# Patient Record
Sex: MALE | Race: WHITE | Employment: FULL TIME | URBAN - METROPOLITAN AREA
[De-identification: names, ages, dates, MRNs, and addresses within clinical notes are randomized per-mention and may not be internally consistent; named-entity substitution may affect disease eponyms.]

---

## 2021-04-11 DIAGNOSIS — Z23 NEED FOR VACCINATION: ICD-10-CM

## 2023-09-22 ENCOUNTER — LAB ENCOUNTER (OUTPATIENT)
Dept: LAB | Age: 48
End: 2023-09-22
Attending: STUDENT IN AN ORGANIZED HEALTH CARE EDUCATION/TRAINING PROGRAM
Payer: COMMERCIAL

## 2023-09-22 ENCOUNTER — HOSPITAL ENCOUNTER (OUTPATIENT)
Dept: GENERAL RADIOLOGY | Age: 48
Discharge: HOME | End: 2023-09-22
Attending: STUDENT IN AN ORGANIZED HEALTH CARE EDUCATION/TRAINING PROGRAM
Payer: COMMERCIAL

## 2023-09-22 DIAGNOSIS — R14.0 BLOATING: ICD-10-CM

## 2023-09-22 DIAGNOSIS — R19.7 DIARRHEA, UNSPECIFIED TYPE: ICD-10-CM

## 2023-09-22 PROBLEM — R39.11 HESITANCY OF MICTURITION: Status: ACTIVE | Noted: 2022-10-17

## 2023-09-22 PROBLEM — R39.15 URGENCY OF URINATION: Status: ACTIVE | Noted: 2022-10-17

## 2023-09-22 PROBLEM — N39.43 BENIGN PROSTATIC HYPERPLASIA (BPH) WITH POST-VOID DRIBBLING: Status: ACTIVE | Noted: 2022-10-17

## 2023-09-22 PROBLEM — N40.1 BENIGN PROSTATIC HYPERPLASIA (BPH) WITH POST-VOID DRIBBLING: Status: ACTIVE | Noted: 2022-10-17

## 2023-09-22 PROBLEM — N20.0 KIDNEY STONE: Status: ACTIVE | Noted: 2022-10-17

## 2023-09-22 PROBLEM — R31.0 GROSS HEMATURIA: Status: ACTIVE | Noted: 2022-10-17

## 2023-09-22 LAB
ALBUMIN SERPL-MCNC: 4.2 G/DL (ref 3.4–5)
ALBUMIN/GLOB SERPL: 1.1 {RATIO} (ref 1–2)
ALP LIVER SERPL-CCNC: 88 U/L
ALT SERPL-CCNC: 49 U/L
ANION GAP SERPL CALC-SCNC: 5 MMOL/L (ref 0–18)
AST SERPL-CCNC: 29 U/L (ref 15–37)
BASOPHILS # BLD AUTO: 0.03 X10(3) UL (ref 0–0.2)
BASOPHILS NFR BLD AUTO: 0.3 %
BILIRUB SERPL-MCNC: 0.5 MG/DL (ref 0.1–2)
BUN BLD-MCNC: 16 MG/DL (ref 7–18)
CALCIUM BLD-MCNC: 9.2 MG/DL (ref 8.5–10.1)
CHLORIDE SERPL-SCNC: 107 MMOL/L (ref 98–112)
CO2 SERPL-SCNC: 30 MMOL/L (ref 21–32)
CREAT BLD-MCNC: 1.07 MG/DL
EGFRCR SERPLBLD CKD-EPI 2021: 85 ML/MIN/1.73M2 (ref 60–?)
EOSINOPHIL # BLD AUTO: 0.28 X10(3) UL (ref 0–0.7)
EOSINOPHIL NFR BLD AUTO: 3.1 %
ERYTHROCYTE [DISTWIDTH] IN BLOOD BY AUTOMATED COUNT: 13.6 %
FASTING STATUS PATIENT QL REPORTED: YES
GLOBULIN PLAS-MCNC: 3.8 G/DL (ref 2.8–4.4)
GLUCOSE BLD-MCNC: 133 MG/DL (ref 70–99)
HCT VFR BLD AUTO: 44.3 %
HGB BLD-MCNC: 15.2 G/DL
IGA SERPL-MCNC: 322 MG/DL (ref 70–312)
IMM GRANULOCYTES # BLD AUTO: 0.03 X10(3) UL (ref 0–1)
IMM GRANULOCYTES NFR BLD: 0.3 %
INR BLD: 0.94 (ref 0.85–1.16)
LIPASE SERPL-CCNC: 64 U/L (ref 13–75)
LYMPHOCYTES # BLD AUTO: 3.53 X10(3) UL (ref 1–4)
LYMPHOCYTES NFR BLD AUTO: 38.9 %
MCH RBC QN AUTO: 28.3 PG (ref 26–34)
MCHC RBC AUTO-ENTMCNC: 34.3 G/DL (ref 31–37)
MCV RBC AUTO: 82.5 FL
MONOCYTES # BLD AUTO: 0.59 X10(3) UL (ref 0.1–1)
MONOCYTES NFR BLD AUTO: 6.5 %
NEUTROPHILS # BLD AUTO: 4.61 X10 (3) UL (ref 1.5–7.7)
NEUTROPHILS # BLD AUTO: 4.61 X10(3) UL (ref 1.5–7.7)
NEUTROPHILS NFR BLD AUTO: 50.9 %
OSMOLALITY SERPL CALC.SUM OF ELEC: 297 MOSM/KG (ref 275–295)
PLATELET # BLD AUTO: 202 10(3)UL (ref 150–450)
POTASSIUM SERPL-SCNC: 3.7 MMOL/L (ref 3.5–5.1)
PROT SERPL-MCNC: 8 G/DL (ref 6.4–8.2)
PROTHROMBIN TIME: 12.6 SECONDS (ref 11.6–14.8)
RBC # BLD AUTO: 5.37 X10(6)UL
SODIUM SERPL-SCNC: 142 MMOL/L (ref 136–145)
WBC # BLD AUTO: 9.1 X10(3) UL (ref 4–11)

## 2023-09-22 PROCEDURE — 74019 RADEX ABDOMEN 2 VIEWS: CPT | Performed by: STUDENT IN AN ORGANIZED HEALTH CARE EDUCATION/TRAINING PROGRAM

## 2023-09-22 PROCEDURE — 85610 PROTHROMBIN TIME: CPT

## 2023-09-22 PROCEDURE — 86364 TISS TRNSGLTMNASE EA IG CLAS: CPT

## 2023-09-22 PROCEDURE — 85025 COMPLETE CBC W/AUTO DIFF WBC: CPT

## 2023-09-22 PROCEDURE — 83690 ASSAY OF LIPASE: CPT

## 2023-09-22 PROCEDURE — 82784 ASSAY IGA/IGD/IGG/IGM EACH: CPT

## 2023-09-22 PROCEDURE — 36415 COLL VENOUS BLD VENIPUNCTURE: CPT

## 2023-09-22 PROCEDURE — 80053 COMPREHEN METABOLIC PANEL: CPT

## 2023-09-23 ENCOUNTER — LAB ENCOUNTER (OUTPATIENT)
Dept: LAB | Age: 48
End: 2023-09-23
Attending: STUDENT IN AN ORGANIZED HEALTH CARE EDUCATION/TRAINING PROGRAM
Payer: COMMERCIAL

## 2023-09-23 ENCOUNTER — HOSPITAL ENCOUNTER (OUTPATIENT)
Dept: ULTRASOUND IMAGING | Age: 48
Discharge: HOME | End: 2023-09-23
Attending: STUDENT IN AN ORGANIZED HEALTH CARE EDUCATION/TRAINING PROGRAM
Payer: COMMERCIAL

## 2023-09-23 DIAGNOSIS — K76.0 FATTY LIVER: ICD-10-CM

## 2023-09-23 DIAGNOSIS — R19.7 DIARRHEA, UNSPECIFIED TYPE: ICD-10-CM

## 2023-09-23 DIAGNOSIS — R14.0 BLOATING: ICD-10-CM

## 2023-09-23 PROCEDURE — 87177 OVA AND PARASITES SMEARS: CPT

## 2023-09-23 PROCEDURE — 87046 STOOL CULTR AEROBIC BACT EA: CPT

## 2023-09-23 PROCEDURE — 87077 CULTURE AEROBIC IDENTIFY: CPT

## 2023-09-23 PROCEDURE — 87209 SMEAR COMPLEX STAIN: CPT

## 2023-09-23 PROCEDURE — 87427 SHIGA-LIKE TOXIN AG IA: CPT

## 2023-09-23 PROCEDURE — 87493 C DIFF AMPLIFIED PROBE: CPT

## 2023-09-23 PROCEDURE — 87329 GIARDIA AG IA: CPT

## 2023-09-23 PROCEDURE — 83993 ASSAY FOR CALPROTECTIN FECAL: CPT

## 2023-09-23 PROCEDURE — 87045 FECES CULTURE AEROBIC BACT: CPT

## 2023-09-23 PROCEDURE — 76700 US EXAM ABDOM COMPLETE: CPT | Performed by: STUDENT IN AN ORGANIZED HEALTH CARE EDUCATION/TRAINING PROGRAM

## 2023-09-23 PROCEDURE — 87272 CRYPTOSPORIDIUM AG IF: CPT

## 2023-09-23 PROCEDURE — 82656 EL-1 FECAL QUAL/SEMIQ: CPT

## 2023-09-24 LAB
C DIFF TOX B STL QL: NEGATIVE
CRYPTOSP AG STL QL IA: NEGATIVE
G LAMBLIA AG STL QL IA: NEGATIVE

## 2023-09-25 LAB
CALPROTECTIN STL-MCNT: 124 ΜG/G (ref ?–50)
TTG IGA SER-ACNC: 0.8 U/ML (ref ?–7)

## 2023-09-25 NOTE — PROGRESS NOTES
C diff neg- schedule EGD, Colonoscopy at [facility] - next week preferably     ----    Kt Aldridge,    Your recent stool study shows that there may be non specific inflammation in your GI tract. I recommend further investigation with an EGD, Colonsocopy - we will call you to schedule this. Your recent stool study shows NO signs of a C difficile infection. Please let me know if you have any questions or concerns.      Sincerely,  Herminio Lino MD

## 2023-09-25 NOTE — PROGRESS NOTES
Kenyon Mota,    Your recent blood work shows NO signs of celiac disease. Your liver enzymes are normal.     Your blood sugar and level of immunoglobulin, which is a protein that is part of your immune system, is higher than normal limits. I recommend you follow-up with your primary care physician for further evaluation of these findings. Please let me know if you have any questions or concerns.      Sincerely,  Brad Corrales MD

## 2023-09-25 NOTE — PROGRESS NOTES
Hi Sofie Devlin,    Your recent ultrasound showed a fatty liver. This is where extra fat deposits within the liver. However, if fatty liver is left untreated, this can potentially lead to other liver and heart issues and conditions. I recommend that you follow a low fat and low calorie diet, with a diet high in fruits and vegetables. I also recommend that you lose about 8-10% of your body weight with diet and exercise in the next 6-12 months, to help minimize the risk of fatty liver. I also recommend that you schedule an appointment with weight loss clinic to help with this, as we discussed in GI clinic. Please let me know if you have any questions or concerns.      Sincerely,  Joaquin Campos MD

## 2023-10-03 LAB — PANCREATIC ELAST FECAL: 307 UG ELAST./G

## 2023-10-24 PROBLEM — R19.7 DIARRHEA: Status: ACTIVE | Noted: 2023-10-24

## 2023-10-24 PROBLEM — R11.0 NAUSEA: Status: ACTIVE | Noted: 2023-10-24

## 2023-10-24 PROBLEM — Z12.11 SPECIAL SCREENING FOR MALIGNANT NEOPLASM OF COLON: Status: ACTIVE | Noted: 2023-10-24

## 2023-10-24 PROBLEM — R14.1 ABDOMINAL GAS PAIN: Status: ACTIVE | Noted: 2023-10-24

## 2023-12-06 ENCOUNTER — GENETICS ENCOUNTER (OUTPATIENT)
Dept: GENETICS | Age: 48
End: 2023-12-06
Attending: GENETIC COUNSELOR, MS
Payer: COMMERCIAL

## 2023-12-06 ENCOUNTER — LABORATORY ENCOUNTER (OUTPATIENT)
Dept: LAB | Age: 48
End: 2023-12-06
Attending: INTERNAL MEDICINE
Payer: COMMERCIAL

## 2023-12-06 ENCOUNTER — NURSE ONLY (OUTPATIENT)
Dept: HEMATOLOGY/ONCOLOGY | Age: 48
End: 2023-12-06
Attending: GENETIC COUNSELOR, MS
Payer: COMMERCIAL

## 2023-12-06 DIAGNOSIS — Z01.818 PRE-OP TESTING: ICD-10-CM

## 2023-12-06 DIAGNOSIS — Z80.41 FAMILY HISTORY OF OVARIAN CANCER: ICD-10-CM

## 2023-12-06 DIAGNOSIS — Z86.010 HISTORY OF COLONOSCOPY WITH POLYPECTOMY: Primary | ICD-10-CM

## 2023-12-06 DIAGNOSIS — Z80.0 FAMILY HISTORY OF MALIGNANT NEOPLASM OF DIGESTIVE ORGAN: ICD-10-CM

## 2023-12-06 DIAGNOSIS — Z98.890 HISTORY OF COLONOSCOPY WITH POLYPECTOMY: Primary | ICD-10-CM

## 2023-12-06 LAB
CHLORIDE SERPL-SCNC: 105 MMOL/L (ref 98–112)
CO2 SERPL-SCNC: 27 MMOL/L (ref 21–32)
POTASSIUM SERPL-SCNC: 3.6 MMOL/L (ref 3.5–5.1)
SODIUM SERPL-SCNC: 139 MMOL/L (ref 136–145)

## 2023-12-06 PROCEDURE — 36415 COLL VENOUS BLD VENIPUNCTURE: CPT

## 2023-12-06 PROCEDURE — 80051 ELECTROLYTE PANEL: CPT

## 2023-12-06 PROCEDURE — 96040 HC GENETIC COUNSELING EA 30 MIN: CPT | Performed by: GENETIC COUNSELOR, MS

## 2023-12-06 NOTE — PROGRESS NOTES
Referring Provider:   Brea Ji MD    Additional Provder(s): MD Nancy Pak MD    Reason for Referral:  Leda Clements was referred for genetic counseling because of a personal history of a large adenomatous colon polyp. Mr. Annette Mathias is a 46year-old man of [facility] descent who has no personal history of cancer. His first screening colonoscopy on 10/24/23 was notable for a 2.1 cm tubular adenoma in the sigmoid colon. Mr. Sarah Landa 10/24/23 EGD was notable for four gastric polyps, ranging in size from five to 15 mm, two sub-cm, non-ulcerated, sub-epithelial lesions (CHEIKH) in the proximal antrum, and two gastric ulcers. A repeat screening colonoscopy was recommended in three years. A follow-up EGD in eight weeks with EUS with Dr Alysha Barrett for evaluation of CHEIKH in stomach was also recommended; this is scheduled for 23. Mr. Annette Mathias is taking Protonix. Social History:  Mr. Annette Mathias was seen today by himself. He lives in [place]. Family History:   A three generation pedigree was obtained. Mr. Annette Mathias has two daughters, ages 21 and 21. Mr. Annette Mathias has two sisters and no brothers. Mr. Sarah Landa mother  at age 76 from congesitive heart failure. Mr. Sarah Landa mother had two brothers and three sisters. One of Mr. Sarah Landa maternal aunts had breast and ovarian cancer. A second maternal aunt had breast cancer at an unspecified age. Neither of Mr. Sarah Landa aunts have had genetic testing. Mr. Sarah Landa maternal grandmother  in her [de-identified] and had an unspecified gynecological cancer. Mr. Sarah Landa maternal grandfather  in his [de-identified] and did not have cancer. Mr. Sarah Landa father  at age 64 from congestive heart failure; he had a history of multiple unspecified skin cancers. Mr. Sarah Landa father had three brothers and two sisters, all of whom are . One of Mr. Sarah Landa paternal uncles  before age 61 from prostate cancer.  A second paternal uncle  before age 61 from metastatic cancer involving the bile duct, stomach, and liver. Mr. Higinio Alvarez third paternal uncle had an unspecified skin cancer and also  before age 61. One of Mr. Higinio Alvarez paternal cousins  at age 25 from an unspecified acute hematologic cancer. Mr. Higinio Alvarez paternal grandmother  in her [de-identified] and did not have cancer. Mr. Higinio Alvarez paternal grandfather  in his 46s from what was reported at that time to be gastric cancer. Please see the pedigree for additional family history information. Counseling: The following information was discussed with Mr. Adam Hair. Genetics of Cancer: The majority of cancers are sporadic whereas approximately 10-30% of cases of cancer cases are attributed to familial factors such as unidentified low penetrance genes in the family or shared environmental factors. Approximately 5-10% of cancers are related to a hereditary cancer syndrome. Signs of a hereditary cancer syndrome include some rare cancers, common cancers occurring at unusually young ages, multiple primary cancers in the some individual, or the same type of cancer or related cancers (e.g., breast and ovarian, colorectal and endometrial) in three or more individuals in the same lineage. Risk Assessment:   Mr. Higinio Alvarez reported family history is suspicious for a hereditary cancer predisposition condition. He meets NCCN Guidelines testing criteria for high-penetrance breast, ovarian, and prostate cancer susceptibility genes based on his reported family history. I recommend that testing be performed as part of a multigene panel. Genetic Testing: The pros, cons, and limitations of genetic testing were discussed at length, including the potential implications of test results on clinical management (see below).       If a pathogenic variant is not identified (negative result), it is still possible that Mr. Adam Hair has a pathogenic variant in one of these genes that was not detected by the genetic test, that the family is dealing with a hereditary cancer syndrome involving a different gene, or that his relatives had cancer due to a mutation that Mr. Kathy Patrick did not inherit. For these reasons, a negative genetic test result in Mr. Kathy Patrick would be relatively uninformative for him and other family members. If no genetic mutation is identified in the family, options for cancer screening/management should be determined according to personal and family histories and should be discussed at length with physicians. A variant of uncertain significance is a DNA change that may or may not alter the function of the gene; therefore, it is usually not possible to determine if the gene variant is responsible for an individual's increased cancer risk. Testing other family members for the variant may be helpful in some families, but is often insufficient to definitively determine if the variant is related to an increased risk of cancer. If Mr. Kathy Patrick is found to carry a pathogenic variant, he is at significantly increased risk for colon, and other cancers. This knowledge would influence his medical recommendations and choices regarding surveillance, screening, and prevention. It was stressed that genetic testing detects the presence of a gene mutation, it does not detect the presence of cancer or predict when, or if, an individual will develop cancer. If Mr. Kathy Patrick were to test positive for a pathogenic variant, his children and siblings would have a 50% chance of carrying the same mutation. They would have the option of pursuing targeted genetic testing at a reduced cost to clarify their cancer risks. Genetic test results have implications for Mr. Ramila Wolf entire biological family. Thus, it is recommended that he share his genetic test results with his biological family members so that they may have their risk assessed. Genetic Information Non-Discrimination Act:   The legal protections of the Genetic Information Nondiscrimination Act (ATTILA) for health insurance and employment were discussed. ATTILA does not provide protection for life insurance, disability or long-term care insurance. Summary and Plan:  Mr. Bianca Muniz was referred for genetic counseling due to personal history of a large adenomatous colon polyp at age 46 and hyperplastic and fundic gland gastric polyps. Mr. Janeen Antonio reported family history is suspicious for a hereditary cancer predisposition condition. Mr. Bianca Muniz meets NCCN Guidelines for germline testing for hereditary breast, ovarian, and prostate cancer predisposition conditions based on his reported family history. At the conclusion of the counseling session Mr. Bianca Muniz decided to proceed with genetic testing. Written consent was obtained. Blood and paperwork were sent to [facility] for their Multi-Cancers panel. I anticipate that Mr. Janeen Antonio results will be available within 2-3 weeks and will call him with the results. Results will also be communicated to Dr. Yaneth Ferrer, Dr. Howard Tubbs, and Dr. Deon Ambrocio. Approximately 40 minutes was spent in consultation with Mr. Bianca Muniz.

## 2023-12-19 ENCOUNTER — HOSPITAL ENCOUNTER (OUTPATIENT)
Facility: HOSPITAL | Age: 48
Setting detail: HOSPITAL OUTPATIENT SURGERY
Discharge: HOME | End: 2023-12-19
Attending: INTERNAL MEDICINE | Admitting: INTERNAL MEDICINE
Payer: COMMERCIAL

## 2023-12-19 ENCOUNTER — ANESTHESIA (OUTPATIENT)
Dept: ENDOSCOPY | Facility: HOSPITAL | Age: 48
End: 2023-12-19
Payer: COMMERCIAL

## 2023-12-19 ENCOUNTER — ANESTHESIA EVENT (OUTPATIENT)
Dept: ENDOSCOPY | Facility: HOSPITAL | Age: 48
End: 2023-12-19
Payer: COMMERCIAL

## 2023-12-19 VITALS
TEMPERATURE: 98 F | SYSTOLIC BLOOD PRESSURE: 120 MMHG | DIASTOLIC BLOOD PRESSURE: 74 MMHG | HEIGHT: 74 IN | OXYGEN SATURATION: 97 % | RESPIRATION RATE: 16 BRPM | BODY MASS INDEX: 40.43 KG/M2 | HEART RATE: 73 BPM | WEIGHT: 315 LBS

## 2023-12-19 DIAGNOSIS — Z01.818 PRE-OP TESTING: Primary | ICD-10-CM

## 2023-12-19 DIAGNOSIS — K25.9 GASTRIC ULCER, UNSPECIFIED CHRONICITY, UNSPECIFIED WHETHER GASTRIC ULCER HEMORRHAGE OR PERFORATION PRESENT: ICD-10-CM

## 2023-12-19 LAB — GLUCOSE BLD-MCNC: 137 MG/DL (ref 70–99)

## 2023-12-19 PROCEDURE — 82962 GLUCOSE BLOOD TEST: CPT

## 2023-12-19 PROCEDURE — 0DB68ZX EXCISION OF STOMACH, VIA NATURAL OR ARTIFICIAL OPENING ENDOSCOPIC, DIAGNOSTIC: ICD-10-PCS | Performed by: INTERNAL MEDICINE

## 2023-12-19 PROCEDURE — BD47ZZZ ULTRASONOGRAPHY OF GASTROINTESTINAL TRACT: ICD-10-PCS | Performed by: INTERNAL MEDICINE

## 2023-12-19 PROCEDURE — 88305 TISSUE EXAM BY PATHOLOGIST: CPT | Performed by: INTERNAL MEDICINE

## 2023-12-19 RX ORDER — LIDOCAINE HYDROCHLORIDE 10 MG/ML
INJECTION, SOLUTION EPIDURAL; INFILTRATION; INTRACAUDAL; PERINEURAL AS NEEDED
Status: DISCONTINUED | OUTPATIENT
Start: 2023-12-19 | End: 2023-12-19 | Stop reason: SURG

## 2023-12-19 RX ORDER — SODIUM CHLORIDE, SODIUM LACTATE, POTASSIUM CHLORIDE, CALCIUM CHLORIDE 600; 310; 30; 20 MG/100ML; MG/100ML; MG/100ML; MG/100ML
INJECTION, SOLUTION INTRAVENOUS CONTINUOUS
Status: DISCONTINUED | OUTPATIENT
Start: 2023-12-19 | End: 2023-12-19

## 2023-12-19 RX ORDER — DEXTROSE MONOHYDRATE 25 G/50ML
50 INJECTION, SOLUTION INTRAVENOUS
Status: DISCONTINUED | OUTPATIENT
Start: 2023-12-19 | End: 2023-12-19

## 2023-12-19 RX ORDER — NICOTINE POLACRILEX 4 MG
30 LOZENGE BUCCAL
Status: DISCONTINUED | OUTPATIENT
Start: 2023-12-19 | End: 2023-12-19

## 2023-12-19 RX ORDER — NICOTINE POLACRILEX 4 MG
15 LOZENGE BUCCAL
Status: DISCONTINUED | OUTPATIENT
Start: 2023-12-19 | End: 2023-12-19

## 2023-12-19 RX ADMIN — SODIUM CHLORIDE, SODIUM LACTATE, POTASSIUM CHLORIDE, CALCIUM CHLORIDE: 600; 310; 30; 20 INJECTION, SOLUTION INTRAVENOUS at 08:19:00

## 2023-12-19 RX ADMIN — SODIUM CHLORIDE, SODIUM LACTATE, POTASSIUM CHLORIDE, CALCIUM CHLORIDE: 600; 310; 30; 20 INJECTION, SOLUTION INTRAVENOUS at 08:37:00

## 2023-12-19 RX ADMIN — LIDOCAINE HYDROCHLORIDE 25 MG: 10 INJECTION, SOLUTION EPIDURAL; INFILTRATION; INTRACAUDAL; PERINEURAL at 08:19:00

## 2023-12-19 NOTE — DISCHARGE INSTRUCTIONS
Home Care Instructions for EGD/EUS With Sedation    Diet:  - Resume your regular diet as tolerated unless otherwise instructed. - start with light meals to minimize bloating.  - Do not drink alcohol today. Medication:  - If you have questions about resuming your normal medications, please contact your Primary Care Physician. Activities:  - Take it easy today. Do not return to work today. - Do not drive today. - Do not operate any machinery today (including kitchen equipment). Colonoscopy:  - You may notice some rectal \"spotting\" (a little blood on the toilet tissue) for a day or two after the exam. This is normal.  - If you experience any rectal bleeding (not spotting), persistent tenderness or sharp severe abdominal pains, oral temperature over 100 degrees Farenheit, light-headedness or dizziness, or any other problems, contact your doctor. EGD/EUS:  - You may have a sore throat for 2-3 days following the exam. This is normal. Gargling with warm salt water (1/2 tsp salt to 1 glass warm water) or using throat lozenges will help. - If you experience any sharp pain in your neck, abdomen or chest, vomiting of blood, oral temperature over 100 degrees Farenheit, light-headedness or dizziness, or any other problems, contact your doctor. **If unable to reach your doctor, please go to the [facility] Emergency Room**    - Your referring physician will receive a full report of your examination.  - If you do not hear from your doctor's office within two weeks of your biopsy, please call them for your results.     Additional Comments/Instructions (if applicable):

## 2023-12-21 ENCOUNTER — GENETICS ENCOUNTER (OUTPATIENT)
Dept: HEMATOLOGY/ONCOLOGY | Facility: HOSPITAL | Age: 48
End: 2023-12-21

## 2023-12-21 NOTE — PROGRESS NOTES
Referring Provider:       Baljit Parnell MD     Additional Provider(s):  MD Stephanie Orozco MD     Reason for Referral:  Uriel Grimaldo had genetic testing performed on 12/06/23 because of a paternal and maternal family history of cancer. 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 No other known pathogenic variants were found in 71 additional genes including: AIP, ALK, NEREYDA*, AXIN2, BAP1, BARD1, BLM, BMPR1A, BRCA1, BRCA2, BRIP1, CDC73, CDH1, CDK4, CDKN1B, CDKN2A (p14ARF), CDKN2A (m08VXA1p), CHEK2, CTNNA1, DICER1*, EGFR, EPCAM*, FH*, FLCN, GREM1*, HOXB13, KIT, LZTR1, MAX*, MBD4, MEN1*, MET*, MITF, MLH1*, MSH2*, MSH3*, MSH6*, MUTYH, NF1*, NF2, NTHL1, PALB2, PDGFRA, PMS2*, POLD1*, POLE, POT1, TIIFB1B, PTCH1, PTEN*, RAD51C, RAD51D, RB1*, RET, SDHA*, SDHAF2, SDHB, SDHC*, SDHD, SMAD4, SMARCA4, SMARCB1, SMARCE1, STK11, SUFU, MPQI299, TP53, TSC1*, TSC2, VHL. Please refer to the report from [facility] ([id number]) for additional testing information. These results were discussed with Mr. Erika Mckenna by phone on 12/20/23. Summary and Plan:  Mr. Erika Mckenna carries an APC variant associated with a 5-10% estimated absolute risk for colorectal cancer in individuals. This gene variant helps explain Mr. Tyler Mcdaniel personal history of a 2.1 cm tubular adenoma found at the time of his first screening colonoscopy at age 46. APC c.3920T>A ([id number], aka [id number]) is NOT associated with a familial adenomatous polyposis (FAP)/attenuated FAP phenotype. Instead, individuals who carry this variant appear to have a 2-fold increased risk of colorectal cancer.  The [facility] has management guidelines for all individuals (both of [place]oroastrian[place] and non-Zoroastrianism descent) carrying the APC [id number] risk allele which include the recommendation for screening colonoscopy every five years, beginning at age 36 (v2.46). The implications of the APC increased risk allele for Mr. Venancio Booker family members were discussed. Mr. Venancio Booker siblings and each of his children has a 50% chance to carry this APC variant. I recommend that Mr. Brayan Reyes share this information with his siblings and other adult relatives so they can discuss this information with their medical providers. I will send Mr. Brayan Reyes a family letter to share with his adult relatives. In the meantime, Mr. Brayan Reyes is encouraged to call me with any questions about his test results. Otherwise, Mr. Brayan Reyes should contact me on an annual basis to see to learn if there have been any updates in genetic testing that would apply to him.     Cc: Aylin Orellana

## 2024-01-02 PROBLEM — G89.4 CHRONIC PAIN DISORDER: Status: ACTIVE | Noted: 2024-01-02

## 2024-01-02 PROBLEM — I71.21 ANEURYSM OF AORTIC ROOT (HCC): Status: ACTIVE | Noted: 2024-01-02

## 2024-01-02 PROBLEM — M47.817 LUMBOSACRAL SPONDYLOSIS WITHOUT MYELOPATHY: Status: ACTIVE | Noted: 2024-01-02

## 2024-01-02 PROBLEM — R60.0 EDEMA OF LOWER EXTREMITY: Status: ACTIVE | Noted: 2024-01-02

## 2024-01-02 PROBLEM — I71.21 ANEURYSM OF ASCENDING AORTA (HCC): Status: ACTIVE | Noted: 2024-01-02

## 2024-01-02 PROBLEM — Q23.1 BICUSPID AORTIC VALVE (HCC): Status: ACTIVE | Noted: 2024-01-02

## 2024-01-02 PROBLEM — E66.01 OBESITY, MORBID, BMI 40.0-49.9 (HCC): Status: ACTIVE | Noted: 2024-01-02

## 2024-01-02 PROBLEM — Q23.1 BICUSPID AORTIC VALVE: Status: ACTIVE | Noted: 2024-01-02

## 2024-01-04 ENCOUNTER — OFFICE VISIT (OUTPATIENT)
Facility: LOCATION | Age: 49
End: 2024-01-04
Payer: COMMERCIAL

## 2024-01-04 VITALS — HEART RATE: 83 BPM | TEMPERATURE: 97 F

## 2024-01-04 DIAGNOSIS — K43.9 VENTRAL HERNIA WITHOUT OBSTRUCTION OR GANGRENE: Primary | ICD-10-CM

## 2024-01-04 PROCEDURE — 99205 OFFICE O/P NEW HI 60 MIN: CPT | Performed by: STUDENT IN AN ORGANIZED HEALTH CARE EDUCATION/TRAINING PROGRAM

## 2024-01-12 ENCOUNTER — HOSPITAL ENCOUNTER (OUTPATIENT)
Dept: CT IMAGING | Facility: HOSPITAL | Age: 49
Discharge: HOME | End: 2024-01-12
Attending: STUDENT IN AN ORGANIZED HEALTH CARE EDUCATION/TRAINING PROGRAM
Payer: COMMERCIAL

## 2024-01-12 DIAGNOSIS — K43.9 VENTRAL HERNIA WITHOUT OBSTRUCTION OR GANGRENE: ICD-10-CM

## 2024-01-12 PROCEDURE — 74176 CT ABD & PELVIS W/O CONTRAST: CPT | Performed by: STUDENT IN AN ORGANIZED HEALTH CARE EDUCATION/TRAINING PROGRAM

## 2024-01-12 NOTE — H&P
New Patient Visit Note       Active Problems      1. Ventral hernia without obstruction or gangrene        Chief Complaint   Chief Complaint   Patient presents with    New Patient     NP - Incisional Hernia, umbilical hernia, patient states the mesh is pushing down on his intestines, painful - stretching and pulling, burning sensation, bulging, no other symptoms, US ABDOMEN COMPLETE  9/23, XR ABDOMEN OBSTRUCTIVE SERIES ROUTINE(2 VW) 9/22.       History of Present Illness   48 year old male who is here for evaluation of a ventral hernia. Patient reports had a ventral hernia repair in the past with Dr. Matthews in 2013 shortly after laparoscopic cholecystectomy. He feels pain in the area of the prior repair. He feels a bulge at times, complains of stretching and pulling in the area.      Allergies  Jesse is allergic to radiology contrast iodinated dyes, percocet [oxycodone-acetaminophen], and seldane [terfenadine].    Past Medical / Surgical / Social / Family History    The past medical and past surgical history have been reviewed by me today.    Past Medical History:   Diagnosis Date    Abdominal pain 11/2022    Lower back pain and cramping    Anxiety 8/2018    When flying    Arthritis 1/2021    Legs and back    Back pain 1/2021    Legs and back    Belching     Bloating     Calculus of kidney 2/2015    Stones    Diabetes mellitus (HCC)     Disorder of thyroid     Esophageal reflux     Food intolerance 3/2022    Dairy intolerance    Gout     Heartburn 6/2014    High blood pressure     High cholesterol     History of cardiac murmur 8/2018    Heart attack    IBS (irritable bowel syndrome)     Indigestion     Loss of appetite     Nausea 11/2022    Felt like flu long duration    Osteoarthritis     Other and unspecified hyperlipidemia     Pain in joints 1/2021    Legs and back    Sleep apnea 10/2012    Apnea    Thyroid disease     Type II or unspecified type diabetes mellitus without mention of complication, not stated as  uncontrolled     Unspecified essential hypertension     Visual impairment      Past Surgical History:   Procedure Laterality Date    ARTHROSCOPY, KNEE, SURGICAL FOR IMPLANTATION OF CULTURED ANALOGOUS CHONDROCYTES Bilateral     CARPAL TUNNEL RELEASE      CHOLECYSTECTOMY  2013    Removed [place]    COLONOSCOPY      ENDOVAS REPAIR, INFRARENL ABDOM AORTIC ANEURYSM/DISSECT  2018    Have not had corrected    HERNIA SURGERY  10/2013    After gall bladder    LAPAROSCOPIC CHOLECYSTECTOMY  10/20/2013    Procedure: LAPAROSCOPIC CHOLECYSTECTOMY;  Surgeon: VLADISLAV Matthews MD;  Location:  MAIN OR    UPPER GI ENDOSCOPY,EXAM         The family history and social history have been reviewed by me today.    Family History   Problem Relation Age of Onset    Hypertension Father     Heart Disorder Father     Diabetes Father             Heart Attack Father             Mental Disorder Mother     Hypertension Mother     Heart Disorder Mother     Diabetes Mother             Heart Attack Mother         Aeortic and Mitril Valve Replacement, TAVR Aeortic    Heart Disorder Maternal Grandmother     Heart Disorder Maternal Grandfather     Mental Disorder Sister     Heart Attack Sister     Stroke Sister      Social History     Socioeconomic History    Marital status:    Tobacco Use    Smoking status: Never   Vaping Use    Vaping Use: Never used   Substance and Sexual Activity    Alcohol use: Yes     Alcohol/week: 3.0 standard drinks of alcohol     Types: 3 Standard drinks or equivalent per week     Comment: rarely    Drug use: Never        Current Outpatient Medications:     pantoprazole 40 MG Oral Tab EC, Take 1 tablet (40 mg total) by mouth before evening meal., Disp: 90 tablet, Rfl: 0    Empagliflozin (JARDIANCE) 25 MG Oral Tab, Take 25 mg by mouth daily., Disp: , Rfl:     Tirzepatide (MOUNJARO) 7.5 MG/0.5ML Subcutaneous Solution Pen-injector, Inject 7.5 mg into the skin once a week. friday, Disp: , Rfl:      nitroglycerin 0.4 MG Sublingual SL Tab, Place 1 tablet (0.4 mg total) under the tongue every 5 (five) minutes as needed for Chest pain., Disp: , Rfl:     ALPRAZolam 0.5 MG Oral Tab, Take 1 tablet (0.5 mg total) by mouth as needed. PRIOR TO FLIGHT, Disp: , Rfl:     amitriptyline 10 MG Oral Tab, Take 1 tablet (10 mg total) by mouth nightly. TAKE AT BEDTIME, Disp: , Rfl:     Olmesartan Medoxomil 40 MG Oral Tab, , Disp: , Rfl:     KLOR-CON M20 20 MEQ Oral Tab CR, , Disp: , Rfl:     Ondansetron HCl (ZOFRAN) 4 mg tablet, Take 1 tablet (4 mg total) by mouth every 8 (eight) hours as needed for Nausea., Disp: , Rfl:     Zolpidem Tartrate (AMBIEN) 10 MG Oral Tab, Take 1 tablet (10 mg total) by mouth nightly as needed for Sleep., Disp: , Rfl:     Atorvastatin Calcium (LIPITOR) 10 MG Oral Tab, Take 4 tablets (40 mg total) by mouth nightly., Disp: , Rfl:     hydrocodone-acetaminophen (NORCO) 7.5-325 MG Oral Tab, Take 1 tablet by mouth every 6 (six) hours as needed for Pain., Disp: , Rfl:     Cyclobenzaprine HCl (CYCLOBENZAPRINE) 10 MG Oral Tab, Take 1 tablet (10 mg total) by mouth 3 (three) times daily as needed for Muscle spasms., Disp: , Rfl:     Atenolol-Chlorthalidone (TENORETIC) 100-25 MG Oral Tab, Take 1 tablet by mouth daily., Disp: , Rfl:     Losartan Potassium (COZAAR) 100 MG Oral Tab, Take 1 tablet (100 mg total) by mouth daily., Disp: , Rfl:     allopurinol (ZYLOPRIM) 300 MG Oral Tab, Take 1 tablet (300 mg total) by mouth daily., Disp: , Rfl:     AmLODIPine Besylate (NORVASC) 10 MG Oral Tab, Take 1 tablet (10 mg total) by mouth daily., Disp: , Rfl:     MetFORMIN HCl (GLUCOPHAGE) 1000 MG Oral Tab, Take 1 tablet (1,000 mg total) by mouth 2 (two) times daily with meals., Disp: , Rfl:     SYNTHROID 125 MCG OR TABS, take 1 tablet (175 mg ) DAILY, Disp: , Rfl:       Review of Systems  The Review of Systems has been reviewed by me during today.  Review of Systems   Constitutional:  Negative for chills, diaphoresis, fatigue  and fever.   HENT:  Negative for ear discharge, ear pain and sore throat.    Eyes:  Negative for pain and discharge.   Respiratory:  Negative for cough, chest tightness and shortness of breath.    Cardiovascular:  Negative for chest pain, palpitations and leg swelling.   Gastrointestinal:  Negative for abdominal distention, abdominal pain, blood in stool, constipation, diarrhea, nausea and vomiting.   Genitourinary:  Negative for dysuria, frequency, hematuria and urgency.   Skin:  Negative for color change, pallor and rash.   Neurological:  Negative for weakness, light-headedness, numbness and headaches.   Hematological:  Negative for adenopathy. Does not bruise/bleed easily.   Psychiatric/Behavioral:  Negative for agitation and confusion.        Physical Findings   Pulse 83   Temp 97.2 °F (36.2 °C) (Temporal)   Physical Exam  Constitutional:       Appearance: Normal appearance.   HENT:      Head: Normocephalic and atraumatic.   Cardiovascular:      Pulses: Normal pulses.   Pulmonary:      Effort: Pulmonary effort is normal.   Abdominal:          Comments: Soft, non tender, non distended, no rebound tenderness or guarding, small umbilical hernia, no clear evidence of hernia on exam in the epigastric region or otherwise in the abdominal wall.    Skin:     General: Skin is warm.      Capillary Refill: Capillary refill takes less than 2 seconds.   Neurological:      Mental Status: He is alert and oriented to person, place, and time. Mental status is at baseline.             Assessment/Plan  1. Ventral hernia without obstruction or gangrene        Jesse Tarango is a 48 year old male here for evaluation of ventral hernia. He has a small umbilical hernia that I am able to appreciate on exam. I did not appreciate a hernia on exam in the area of the patient's concern. I am going to obtain a CT of the abdomen and pelvis to better delineate the anatomy.   Follow up after CT scan .              Houston Shipman MD

## 2024-01-15 NOTE — PROGRESS NOTES
Date: 1/15/2024    To: Jesse Tarango  : 8/15/1975    I hope this letter finds you doing well.  I am writing to inform you of the following:     The biopsies obtained at the time of your recent endoscopic procedure were benign and showed no evidence of infection or malignancy.     Please call the office at [phone] if there are any questions.    Regards,    Braxton Vargas M.D.

## 2024-01-22 ENCOUNTER — OFFICE VISIT (OUTPATIENT)
Facility: LOCATION | Age: 49
End: 2024-01-22
Payer: COMMERCIAL

## 2024-01-22 VITALS — HEART RATE: 77 BPM | TEMPERATURE: 97 F

## 2024-01-22 DIAGNOSIS — K43.9 VENTRAL HERNIA WITHOUT OBSTRUCTION OR GANGRENE: Primary | ICD-10-CM

## 2024-02-05 NOTE — PROGRESS NOTES
Follow Up Visit Note       Active Problems      1. Ventral hernia without obstruction or gangrene          Chief Complaint   Chief Complaint   Patient presents with    \[facility] [place] [facility]\"" Care     EP- 1/12 CT Abd/Pel Review          History of Present Illness  48 year old male who is here for evaluation of a ventral hernia. He underwent a Ct scan of the abdomen and pelvis . He continues to have a prominent bulge in the epigastric area with pain. Pain is continuous and moderate in severity.       Allergies  Jesse is allergic to radiology contrast iodinated dyes, percocet [oxycodone-acetaminophen], and seldane [terfenadine].    Past Medical / Surgical / Social / Family History    The past medical and past surgical history have been reviewed by me today.    Past Medical History:   Diagnosis Date    Abdominal pain 11/2022    Lower back pain and cramping    Anxiety 8/2018    When flying    Arthritis 1/2021    Legs and back    Back pain 1/2021    Legs and back    Belching     Bloating     Calculus of kidney 2/2015    Stones    Diabetes mellitus (HCC)     Disorder of thyroid     Esophageal reflux     Food intolerance 3/2022    Dairy intolerance    Gout     Heartburn 6/2014    High blood pressure     High cholesterol     History of cardiac murmur 8/2018    Heart attack    IBS (irritable bowel syndrome)     Indigestion     Loss of appetite     Nausea 11/2022    Felt like flu long duration    Osteoarthritis     Other and unspecified hyperlipidemia     Pain in joints 1/2021    Legs and back    Sleep apnea 10/2012    Apnea    Thyroid disease     Type II or unspecified type diabetes mellitus without mention of complication, not stated as uncontrolled     Unspecified essential hypertension     Visual impairment      Past Surgical History:   Procedure Laterality Date    ARTHROSCOPY, KNEE, SURGICAL FOR IMPLANTATION OF CULTURED ANALOGOUS CHONDROCYTES Bilateral     CARPAL TUNNEL RELEASE      CHOLECYSTECTOMY  8/2013    Sandra Ronquillo     COLONOSCOPY      ENDOVAS REPAIR, INFRARENL ABDOM AORTIC ANEURYSM/DISSECT  2018    Have not had corrected    HERNIA SURGERY  10/2013    After gall bladder    LAPAROSCOPIC CHOLECYSTECTOMY  10/20/2013    Procedure: LAPAROSCOPIC CHOLECYSTECTOMY;  Surgeon: VLADISLAV Matthews MD;  Location:  MAIN OR    UPPER GI ENDOSCOPY,EXAM         The family history and social history have been reviewed by me today.    Family History   Problem Relation Age of Onset    Hypertension Father     Heart Disorder Father     Diabetes Father             Heart Attack Father             Mental Disorder Mother     Hypertension Mother     Heart Disorder Mother     Diabetes Mother             Heart Attack Mother         Aeortic and Mitril Valve Replacement, TAVR Aeortic    Heart Disorder Maternal Grandmother     Heart Disorder Maternal Grandfather     Mental Disorder Sister     Heart Attack Sister     Stroke Sister      Social History     Socioeconomic History    Marital status:    Tobacco Use    Smoking status: Never   Vaping Use    Vaping Use: Never used   Substance and Sexual Activity    Alcohol use: Yes     Alcohol/week: 3.0 standard drinks of alcohol     Types: 3 Standard drinks or equivalent per week     Comment: rarely    Drug use: Never        Current Outpatient Medications:     pantoprazole 40 MG Oral Tab EC, Take 1 tablet (40 mg total) by mouth before evening meal., Disp: 90 tablet, Rfl: 0    Empagliflozin (JARDIANCE) 25 MG Oral Tab, Take 25 mg by mouth daily., Disp: , Rfl:     Tirzepatide (MOUNJARO) 7.5 MG/0.5ML Subcutaneous Solution Pen-injector, Inject 7.5 mg into the skin once a week. friday, Disp: , Rfl:     nitroglycerin 0.4 MG Sublingual SL Tab, Place 1 tablet (0.4 mg total) under the tongue every 5 (five) minutes as needed for Chest pain., Disp: , Rfl:     ALPRAZolam 0.5 MG Oral Tab, Take 1 tablet (0.5 mg total) by mouth as needed. PRIOR TO FLIGHT, Disp: , Rfl:     amitriptyline 10 MG Oral Tab,  Take 1 tablet (10 mg total) by mouth nightly. TAKE AT BEDTIME, Disp: , Rfl:     Olmesartan Medoxomil 40 MG Oral Tab, , Disp: , Rfl:     KLOR-CON M20 20 MEQ Oral Tab CR, , Disp: , Rfl:     Ondansetron HCl (ZOFRAN) 4 mg tablet, Take 1 tablet (4 mg total) by mouth every 8 (eight) hours as needed for Nausea., Disp: , Rfl:     Zolpidem Tartrate (AMBIEN) 10 MG Oral Tab, Take 1 tablet (10 mg total) by mouth nightly as needed for Sleep., Disp: , Rfl:     Atorvastatin Calcium (LIPITOR) 10 MG Oral Tab, Take 4 tablets (40 mg total) by mouth nightly., Disp: , Rfl:     hydrocodone-acetaminophen (NORCO) 7.5-325 MG Oral Tab, Take 1 tablet by mouth every 6 (six) hours as needed for Pain., Disp: , Rfl:     Cyclobenzaprine HCl (CYCLOBENZAPRINE) 10 MG Oral Tab, Take 1 tablet (10 mg total) by mouth 3 (three) times daily as needed for Muscle spasms., Disp: , Rfl:     Atenolol-Chlorthalidone (TENORETIC) 100-25 MG Oral Tab, Take 1 tablet by mouth daily., Disp: , Rfl:     Losartan Potassium (COZAAR) 100 MG Oral Tab, Take 1 tablet (100 mg total) by mouth daily., Disp: , Rfl:     allopurinol (ZYLOPRIM) 300 MG Oral Tab, Take 1 tablet (300 mg total) by mouth daily., Disp: , Rfl:     AmLODIPine Besylate (NORVASC) 10 MG Oral Tab, Take 1 tablet (10 mg total) by mouth daily., Disp: , Rfl:     MetFORMIN HCl (GLUCOPHAGE) 1000 MG Oral Tab, Take 1 tablet (1,000 mg total) by mouth 2 (two) times daily with meals., Disp: , Rfl:     SYNTHROID 125 MCG OR TABS, take 1 tablet (175 mg ) DAILY, Disp: , Rfl:      Review of Systems  The Review of Systems has been reviewed by me during today.  Review of Systems   Constitutional:  Negative for chills, diaphoresis, fatigue and fever.   HENT:  Negative for ear discharge, ear pain and sore throat.    Eyes:  Negative for pain and discharge.   Respiratory:  Negative for cough, chest tightness and shortness of breath.    Cardiovascular:  Negative for chest pain, palpitations and leg swelling.   Gastrointestinal:   Positive for abdominal pain. Negative for abdominal distention, blood in stool, constipation, diarrhea, nausea and vomiting.   Genitourinary:  Negative for dysuria, frequency, hematuria and urgency.   Skin:  Negative for color change, pallor and rash.   Neurological:  Negative for weakness, light-headedness, numbness and headaches.   Hematological:  Negative for adenopathy. Does not bruise/bleed easily.   Psychiatric/Behavioral:  Negative for agitation and confusion.         Physical Findings   Pulse 77   Temp 96.6 °F (35.9 °C) (Temporal)   Physical Exam  Constitutional:       Appearance: Normal appearance.   HENT:      Head: Normocephalic and atraumatic.   Cardiovascular:      Pulses: Normal pulses.   Pulmonary:      Effort: Pulmonary effort is normal.   Abdominal:          Comments: Soft, tender in the epigastric area, palpable ventral hernia with incarcerated fat, otherwise the abdomen is otherwise non tender, non distended, no rebound tenderness or guarding   Skin:     General: Skin is warm.      Capillary Refill: Capillary refill takes less than 2 seconds.   Neurological:      Mental Status: He is alert and oriented to person, place, and time. Mental status is at baseline.              Assessment/Plan  1. Ventral hernia without obstruction or gangrene        Jesse Tarango is a 48 year old male referred by Dr. Nicolas for evaluation of ventral hernia. I obtained a CT scan to better delineate the abdominal wall anatomy. There is a recurrent abdominal wall hernia in the epigastric area with a migrated mesh. There is incarcerated fat within the defect. It correlates exactly with the area of the patients complains of pain and bulge. I discussed with him robotic repair of the ventral hernia. I explained the risks and benefits of surgery including but not limited to the risks of bleeding , infection, mesh infection, mesh complications, and recurrence. Patient understands the risks and would like to proceed with  proposed procedure.          No orders of the defined types were placed in this encounter.      Imaging & Referrals   None    Follow Up  No follow-ups on file.    Houston Shipman MD

## 2024-03-15 ENCOUNTER — TELEPHONE (OUTPATIENT)
Facility: LOCATION | Age: 49
End: 2024-03-15

## 2024-03-15 NOTE — TELEPHONE ENCOUNTER
Transaction ID: [id number] ID: [id number] Date: 2024-03-15  OBI HOPKINS Patient  Member ID  [id number]    Date of Birth  1975-08-15    Gender  Male    Relationship to Subscriber  Spouse    Subscriber Name  LETA HOPKINS    Transaction Type  Outpatient Authorization    Organization  [facility]    Payer  [facility] logo     Certificate Information  Reference Number  [id number]    Status  NO ACTION REQUIRED    Message  Requested Service does not require preauthorization. We would strongly encourage you to check benefits for this service.    Member Information  Patient Name  OBI HOPKINS    Patient Date of Birth  1975-08-15    Patient Gender  Male    Member ID  [id number]    Relationship to Subscriber  Spouse    Subscriber Name  LETA HOPKINS    Requesting Provider     Name  LATRICIA CHOE    NPI  [id number]    Tax Id  [id number]    Specialty  [id number]  Provider Role  Provider    Address  [address] [place] [address]    Phone  [phone]  Fax  [phone]    Contact Name  ANITHA PIERRE    Service Information  Service Type  2 - Surgical    Place of Service  22 - On [facility]    Service From - To Date  2024-03-29 - 2024-06-28    Level of Service  Elective    Diagnosis Code 1  K439 - Ventral hernia without obstruction or gangrene    Procedure Code 1 (CPT/HCPCS)  01477 - RPR AA HRN 1ST > 10 RDC    Quantity  1 Units    Status  NO ACTION REQUIRED    Rendering Provider/Facility     Provider 1  Name  LATRICIA CHOE    NPI  [id number]    Specialty  [id number]  Provider Role  Attending    Address  [address] [place] [address]    Fax  [phone]    Provider 2  Name  [facility]    NPI  [id number]    Provider Role  Facility    Address  [address] [place] [address]

## 2024-03-23 ENCOUNTER — LAB ENCOUNTER (OUTPATIENT)
Dept: LAB | Age: 49
End: 2024-03-23
Attending: STUDENT IN AN ORGANIZED HEALTH CARE EDUCATION/TRAINING PROGRAM
Payer: COMMERCIAL

## 2024-03-23 DIAGNOSIS — Z79.4 TYPE 2 DIABETES MELLITUS WITHOUT COMPLICATION, WITH LONG-TERM CURRENT USE OF INSULIN (HCC): ICD-10-CM

## 2024-03-23 DIAGNOSIS — Z01.818 PRE-OP TESTING: ICD-10-CM

## 2024-03-23 DIAGNOSIS — K43.9 VENTRAL HERNIA: ICD-10-CM

## 2024-03-23 DIAGNOSIS — E11.9 TYPE 2 DIABETES MELLITUS WITHOUT COMPLICATION, WITH LONG-TERM CURRENT USE OF INSULIN (HCC): ICD-10-CM

## 2024-03-23 LAB
ANION GAP SERPL CALC-SCNC: 7 MMOL/L (ref 0–18)
BUN BLD-MCNC: 15 MG/DL (ref 9–23)
CALCIUM BLD-MCNC: 9.4 MG/DL (ref 8.5–10.1)
CHLORIDE SERPL-SCNC: 108 MMOL/L (ref 98–112)
CO2 SERPL-SCNC: 27 MMOL/L (ref 21–32)
CREAT BLD-MCNC: 1.09 MG/DL
EGFRCR SERPLBLD CKD-EPI 2021: 83 ML/MIN/1.73M2 (ref 60–?)
GLUCOSE BLD-MCNC: 133 MG/DL (ref 70–99)
OSMOLALITY SERPL CALC.SUM OF ELEC: 297 MOSM/KG (ref 275–295)
POTASSIUM SERPL-SCNC: 3.3 MMOL/L (ref 3.5–5.1)
SODIUM SERPL-SCNC: 142 MMOL/L (ref 136–145)

## 2024-03-23 PROCEDURE — 36415 COLL VENOUS BLD VENIPUNCTURE: CPT

## 2024-03-23 PROCEDURE — 80048 BASIC METABOLIC PNL TOTAL CA: CPT

## 2024-03-28 ENCOUNTER — ANESTHESIA EVENT (OUTPATIENT)
Dept: SURGERY | Facility: HOSPITAL | Age: 49
End: 2024-03-28
Payer: COMMERCIAL

## 2024-03-29 ENCOUNTER — ANESTHESIA (OUTPATIENT)
Dept: SURGERY | Facility: HOSPITAL | Age: 49
End: 2024-03-29
Payer: COMMERCIAL

## 2024-03-29 ENCOUNTER — HOSPITAL ENCOUNTER (OUTPATIENT)
Facility: HOSPITAL | Age: 49
Setting detail: HOSPITAL OUTPATIENT SURGERY
Discharge: HOME | End: 2024-03-29
Attending: STUDENT IN AN ORGANIZED HEALTH CARE EDUCATION/TRAINING PROGRAM | Admitting: STUDENT IN AN ORGANIZED HEALTH CARE EDUCATION/TRAINING PROGRAM
Payer: COMMERCIAL

## 2024-03-29 VITALS
OXYGEN SATURATION: 94 % | DIASTOLIC BLOOD PRESSURE: 73 MMHG | SYSTOLIC BLOOD PRESSURE: 117 MMHG | HEIGHT: 74 IN | BODY MASS INDEX: 40.43 KG/M2 | WEIGHT: 315 LBS | HEART RATE: 80 BPM | TEMPERATURE: 98 F | RESPIRATION RATE: 16 BRPM

## 2024-03-29 DIAGNOSIS — E11.9 TYPE 2 DIABETES MELLITUS WITHOUT COMPLICATION, WITH LONG-TERM CURRENT USE OF INSULIN (HCC): Primary | ICD-10-CM

## 2024-03-29 DIAGNOSIS — Z79.4 TYPE 2 DIABETES MELLITUS WITHOUT COMPLICATION, WITH LONG-TERM CURRENT USE OF INSULIN (HCC): Primary | ICD-10-CM

## 2024-03-29 DIAGNOSIS — K43.9 VENTRAL HERNIA: ICD-10-CM

## 2024-03-29 DIAGNOSIS — Z01.818 PRE-OP TESTING: ICD-10-CM

## 2024-03-29 LAB
GLUCOSE BLD-MCNC: 198 MG/DL (ref 70–99)
GLUCOSE BLD-MCNC: 224 MG/DL (ref 70–99)

## 2024-03-29 PROCEDURE — 0WUF4JZ SUPPLEMENT ABDOMINAL WALL WITH SYNTHETIC SUBSTITUTE, PERCUTANEOUS ENDOSCOPIC APPROACH: ICD-10-PCS | Performed by: STUDENT IN AN ORGANIZED HEALTH CARE EDUCATION/TRAINING PROGRAM

## 2024-03-29 PROCEDURE — 76942 ECHO GUIDE FOR BIOPSY: CPT | Performed by: STUDENT IN AN ORGANIZED HEALTH CARE EDUCATION/TRAINING PROGRAM

## 2024-03-29 PROCEDURE — 8E0W4CZ ROBOTIC ASSISTED PROCEDURE OF TRUNK REGION, PERCUTANEOUS ENDOSCOPIC APPROACH: ICD-10-PCS | Performed by: STUDENT IN AN ORGANIZED HEALTH CARE EDUCATION/TRAINING PROGRAM

## 2024-03-29 DEVICE — GORE SYNECOR PREPERITONEAL 15CMX20CM OVAL BIOMATERIAL
Type: IMPLANTABLE DEVICE | Site: ABDOMEN | Status: FUNCTIONAL
Brand: GORE SYNECOR PREPERITONEAL BIOMATERIAL

## 2024-03-29 RX ORDER — HYDROMORPHONE HYDROCHLORIDE 1 MG/ML
0.6 INJECTION, SOLUTION INTRAMUSCULAR; INTRAVENOUS; SUBCUTANEOUS EVERY 5 MIN PRN
Status: DISCONTINUED | OUTPATIENT
Start: 2024-03-29 | End: 2024-03-29

## 2024-03-29 RX ORDER — HEPARIN SODIUM 5000 [USP'U]/ML
INJECTION, SOLUTION INTRAVENOUS; SUBCUTANEOUS
Status: DISCONTINUED
Start: 2024-03-29 | End: 2024-03-29

## 2024-03-29 RX ORDER — KETOROLAC TROMETHAMINE 30 MG/ML
INJECTION, SOLUTION INTRAMUSCULAR; INTRAVENOUS AS NEEDED
Status: DISCONTINUED | OUTPATIENT
Start: 2024-03-29 | End: 2024-03-29 | Stop reason: SURG

## 2024-03-29 RX ORDER — PHENYLEPHRINE HCL 10 MG/ML
VIAL (ML) INJECTION AS NEEDED
Status: DISCONTINUED | OUTPATIENT
Start: 2024-03-29 | End: 2024-03-29 | Stop reason: SURG

## 2024-03-29 RX ORDER — LIDOCAINE HYDROCHLORIDE 10 MG/ML
INJECTION, SOLUTION EPIDURAL; INFILTRATION; INTRACAUDAL; PERINEURAL AS NEEDED
Status: DISCONTINUED | OUTPATIENT
Start: 2024-03-29 | End: 2024-03-29 | Stop reason: SURG

## 2024-03-29 RX ORDER — KETAMINE HYDROCHLORIDE 50 MG/ML
INJECTION, SOLUTION INTRAMUSCULAR; INTRAVENOUS AS NEEDED
Status: DISCONTINUED | OUTPATIENT
Start: 2024-03-29 | End: 2024-03-29 | Stop reason: SURG

## 2024-03-29 RX ORDER — DEXTROSE MONOHYDRATE 25 G/50ML
50 INJECTION, SOLUTION INTRAVENOUS
Status: DISCONTINUED | OUTPATIENT
Start: 2024-03-29 | End: 2024-03-29 | Stop reason: HOSPADM

## 2024-03-29 RX ORDER — CEFAZOLIN SODIUM IN 0.9 % NACL 3 G/100 ML
INTRAVENOUS SOLUTION, PIGGYBACK (ML) INTRAVENOUS
Status: DISCONTINUED
Start: 2024-03-29 | End: 2024-03-29

## 2024-03-29 RX ORDER — INSULIN ASPART 100 [IU]/ML
INJECTION, SOLUTION INTRAVENOUS; SUBCUTANEOUS ONCE
Status: COMPLETED | OUTPATIENT
Start: 2024-03-29 | End: 2024-03-29

## 2024-03-29 RX ORDER — ACETAMINOPHEN 500 MG
1000 TABLET ORAL ONCE AS NEEDED
Status: DISCONTINUED | OUTPATIENT
Start: 2024-03-29 | End: 2024-03-29

## 2024-03-29 RX ORDER — INSULIN ASPART 100 [IU]/ML
INJECTION, SOLUTION INTRAVENOUS; SUBCUTANEOUS
Status: COMPLETED
Start: 2024-03-29 | End: 2024-03-29

## 2024-03-29 RX ORDER — SODIUM CHLORIDE, SODIUM LACTATE, POTASSIUM CHLORIDE, CALCIUM CHLORIDE 600; 310; 30; 20 MG/100ML; MG/100ML; MG/100ML; MG/100ML
INJECTION, SOLUTION INTRAVENOUS CONTINUOUS
Status: DISCONTINUED | OUTPATIENT
Start: 2024-03-29 | End: 2024-03-29

## 2024-03-29 RX ORDER — CEFAZOLIN SODIUM IN 0.9 % NACL 3 G/100 ML
3 INTRAVENOUS SOLUTION, PIGGYBACK (ML) INTRAVENOUS ONCE
Status: COMPLETED | OUTPATIENT
Start: 2024-03-29 | End: 2024-03-29

## 2024-03-29 RX ORDER — DEXAMETHASONE SODIUM PHOSPHATE 4 MG/ML
VIAL (ML) INJECTION AS NEEDED
Status: DISCONTINUED | OUTPATIENT
Start: 2024-03-29 | End: 2024-03-29 | Stop reason: SURG

## 2024-03-29 RX ORDER — ACETAMINOPHEN 500 MG
1000 TABLET ORAL ONCE
Status: DISCONTINUED | OUTPATIENT
Start: 2024-03-29 | End: 2024-03-29 | Stop reason: HOSPADM

## 2024-03-29 RX ORDER — ONDANSETRON 2 MG/ML
INJECTION INTRAMUSCULAR; INTRAVENOUS AS NEEDED
Status: DISCONTINUED | OUTPATIENT
Start: 2024-03-29 | End: 2024-03-29 | Stop reason: SURG

## 2024-03-29 RX ORDER — HYDROMORPHONE HYDROCHLORIDE 1 MG/ML
0.2 INJECTION, SOLUTION INTRAMUSCULAR; INTRAVENOUS; SUBCUTANEOUS EVERY 5 MIN PRN
Status: DISCONTINUED | OUTPATIENT
Start: 2024-03-29 | End: 2024-03-29

## 2024-03-29 RX ORDER — ONDANSETRON 2 MG/ML
4 INJECTION INTRAMUSCULAR; INTRAVENOUS EVERY 6 HOURS PRN
Status: DISCONTINUED | OUTPATIENT
Start: 2024-03-29 | End: 2024-03-29

## 2024-03-29 RX ORDER — BUPIVACAINE HYDROCHLORIDE 2.5 MG/ML
INJECTION, SOLUTION EPIDURAL; INFILTRATION; INTRACAUDAL AS NEEDED
Status: DISCONTINUED | OUTPATIENT
Start: 2024-03-29 | End: 2024-03-29 | Stop reason: HOSPADM

## 2024-03-29 RX ORDER — HYDROCODONE BITARTRATE AND ACETAMINOPHEN 5; 325 MG/1; MG/1
2 TABLET ORAL ONCE AS NEEDED
Status: DISCONTINUED | OUTPATIENT
Start: 2024-03-29 | End: 2024-03-29

## 2024-03-29 RX ORDER — ROCURONIUM BROMIDE 10 MG/ML
INJECTION, SOLUTION INTRAVENOUS AS NEEDED
Status: DISCONTINUED | OUTPATIENT
Start: 2024-03-29 | End: 2024-03-29 | Stop reason: SURG

## 2024-03-29 RX ORDER — METOCLOPRAMIDE HYDROCHLORIDE 5 MG/ML
INJECTION INTRAMUSCULAR; INTRAVENOUS AS NEEDED
Status: DISCONTINUED | OUTPATIENT
Start: 2024-03-29 | End: 2024-03-29 | Stop reason: SURG

## 2024-03-29 RX ORDER — HYDROMORPHONE HYDROCHLORIDE 1 MG/ML
0.4 INJECTION, SOLUTION INTRAMUSCULAR; INTRAVENOUS; SUBCUTANEOUS EVERY 5 MIN PRN
Status: DISCONTINUED | OUTPATIENT
Start: 2024-03-29 | End: 2024-03-29

## 2024-03-29 RX ORDER — ESMOLOL HYDROCHLORIDE 10 MG/ML
INJECTION INTRAVENOUS AS NEEDED
Status: DISCONTINUED | OUTPATIENT
Start: 2024-03-29 | End: 2024-03-29 | Stop reason: SURG

## 2024-03-29 RX ORDER — NALOXONE HYDROCHLORIDE 0.4 MG/ML
0.08 INJECTION, SOLUTION INTRAMUSCULAR; INTRAVENOUS; SUBCUTANEOUS AS NEEDED
Status: DISCONTINUED | OUTPATIENT
Start: 2024-03-29 | End: 2024-03-29

## 2024-03-29 RX ORDER — NICOTINE POLACRILEX 4 MG
15 LOZENGE BUCCAL
Status: DISCONTINUED | OUTPATIENT
Start: 2024-03-29 | End: 2024-03-29 | Stop reason: HOSPADM

## 2024-03-29 RX ORDER — HYDROCODONE BITARTRATE AND ACETAMINOPHEN 5; 325 MG/1; MG/1
1 TABLET ORAL ONCE AS NEEDED
Status: DISCONTINUED | OUTPATIENT
Start: 2024-03-29 | End: 2024-03-29

## 2024-03-29 RX ORDER — MIDAZOLAM HYDROCHLORIDE 1 MG/ML
INJECTION INTRAMUSCULAR; INTRAVENOUS AS NEEDED
Status: DISCONTINUED | OUTPATIENT
Start: 2024-03-29 | End: 2024-03-29 | Stop reason: SURG

## 2024-03-29 RX ORDER — PROCHLORPERAZINE EDISYLATE 5 MG/ML
5 INJECTION INTRAMUSCULAR; INTRAVENOUS EVERY 8 HOURS PRN
Status: DISCONTINUED | OUTPATIENT
Start: 2024-03-29 | End: 2024-03-29

## 2024-03-29 RX ORDER — HEPARIN SODIUM 5000 [USP'U]/ML
5000 INJECTION, SOLUTION INTRAVENOUS; SUBCUTANEOUS ONCE
Status: COMPLETED | OUTPATIENT
Start: 2024-03-29 | End: 2024-03-29

## 2024-03-29 RX ORDER — SCOLOPAMINE TRANSDERMAL SYSTEM 1 MG/1
1 PATCH, EXTENDED RELEASE TRANSDERMAL ONCE
Status: DISCONTINUED | OUTPATIENT
Start: 2024-03-29 | End: 2024-03-29 | Stop reason: HOSPADM

## 2024-03-29 RX ORDER — NICOTINE POLACRILEX 4 MG
30 LOZENGE BUCCAL
Status: DISCONTINUED | OUTPATIENT
Start: 2024-03-29 | End: 2024-03-29 | Stop reason: HOSPADM

## 2024-03-29 RX ADMIN — ROCURONIUM BROMIDE 50 MG: 10 INJECTION, SOLUTION INTRAVENOUS at 09:26:00

## 2024-03-29 RX ADMIN — LIDOCAINE HYDROCHLORIDE 50 MG: 10 INJECTION, SOLUTION EPIDURAL; INFILTRATION; INTRACAUDAL; PERINEURAL at 07:40:00

## 2024-03-29 RX ADMIN — ESMOLOL HYDROCHLORIDE 30 MG: 10 INJECTION INTRAVENOUS at 07:42:00

## 2024-03-29 RX ADMIN — METOCLOPRAMIDE HYDROCHLORIDE 10 MG: 5 INJECTION INTRAMUSCULAR; INTRAVENOUS at 07:57:00

## 2024-03-29 RX ADMIN — PHENYLEPHRINE HCL 150 MCG: 10 MG/ML VIAL (ML) INJECTION at 08:06:00

## 2024-03-29 RX ADMIN — MIDAZOLAM HYDROCHLORIDE 2 MG: 1 INJECTION INTRAMUSCULAR; INTRAVENOUS at 07:35:00

## 2024-03-29 RX ADMIN — DEXAMETHASONE SODIUM PHOSPHATE 4 MG: 4 MG/ML VIAL (ML) INJECTION at 07:57:00

## 2024-03-29 RX ADMIN — ROCURONIUM BROMIDE 20 MG: 10 INJECTION, SOLUTION INTRAVENOUS at 10:32:00

## 2024-03-29 RX ADMIN — ROCURONIUM BROMIDE 10 MG: 10 INJECTION, SOLUTION INTRAVENOUS at 11:36:00

## 2024-03-29 RX ADMIN — ONDANSETRON 4 MG: 2 INJECTION INTRAMUSCULAR; INTRAVENOUS at 10:50:00

## 2024-03-29 RX ADMIN — SODIUM CHLORIDE, SODIUM LACTATE, POTASSIUM CHLORIDE, CALCIUM CHLORIDE: 600; 310; 30; 20 INJECTION, SOLUTION INTRAVENOUS at 07:31:00

## 2024-03-29 RX ADMIN — ROCURONIUM BROMIDE 50 MG: 10 INJECTION, SOLUTION INTRAVENOUS at 08:43:00

## 2024-03-29 RX ADMIN — ROCURONIUM BROMIDE 100 MG: 10 INJECTION, SOLUTION INTRAVENOUS at 07:42:00

## 2024-03-29 RX ADMIN — KETOROLAC TROMETHAMINE 30 MG: 30 INJECTION, SOLUTION INTRAMUSCULAR; INTRAVENOUS at 10:50:00

## 2024-03-29 RX ADMIN — KETAMINE HYDROCHLORIDE 25 MG: 50 INJECTION, SOLUTION INTRAMUSCULAR; INTRAVENOUS at 08:00:00

## 2024-03-29 RX ADMIN — PHENYLEPHRINE HCL 150 MCG: 10 MG/ML VIAL (ML) INJECTION at 08:25:00

## 2024-03-29 RX ADMIN — SODIUM CHLORIDE, SODIUM LACTATE, POTASSIUM CHLORIDE, CALCIUM CHLORIDE: 600; 310; 30; 20 INJECTION, SOLUTION INTRAVENOUS at 12:35:00

## 2024-03-29 RX ADMIN — ROCURONIUM BROMIDE 10 MG: 10 INJECTION, SOLUTION INTRAVENOUS at 11:27:00

## 2024-03-29 RX ADMIN — CEFAZOLIN SODIUM IN 0.9 % NACL 3 G: 3 G/100 ML INTRAVENOUS SOLUTION, PIGGYBACK (ML) INTRAVENOUS at 07:50:00

## 2024-03-29 RX ADMIN — PHENYLEPHRINE HCL 150 MCG: 10 MG/ML VIAL (ML) INJECTION at 08:14:00

## 2024-03-29 RX ADMIN — CEFAZOLIN SODIUM IN 0.9 % NACL 3 G: 3 G/100 ML INTRAVENOUS SOLUTION, PIGGYBACK (ML) INTRAVENOUS at 11:50:00

## 2024-03-29 RX ADMIN — ROCURONIUM BROMIDE 10 MG: 10 INJECTION, SOLUTION INTRAVENOUS at 10:53:00

## 2024-03-29 RX ADMIN — KETAMINE HYDROCHLORIDE 25 MG: 50 INJECTION, SOLUTION INTRAMUSCULAR; INTRAVENOUS at 09:24:00

## 2024-03-29 NOTE — ANESTHESIA PROCEDURE NOTES
Airway  Date/Time: 3/29/2024 7:44 AM  Urgency: elective    Airway not difficult    General Information and Staff    Patient location during procedure: OR  Anesthesiologist: Angela Selby MD  Performed: anesthesiologist   Performed by: Angela Selby MD  Authorized by: Angela Selby MD      Indications and Patient Condition  Indications for airway management: anesthesia  Spontaneous Ventilation: absent  Sedation level: deep  Preoxygenated: yes  Patient position: sniffing  Mask difficulty assessment: 2 - vent by mask + OA or adjuvant +/- NMBA    Final Airway Details  Final airway type: endotracheal airway      Successful airway: ETT  Cuffed: yes   Successful intubation technique: direct laryngoscopy  Facilitating devices/methods: intubating stylet  Endotracheal tube insertion site: oral  Blade: Alejandra  Blade size: #4  ETT size (mm): 8.0    Cormack-Lehane Classification: grade IIB - view of arytenoids or posterior of glottis only  Placement verified by: capnometry   Cuff volume (mL): 7  Measured from: lips  ETT to lips (cm): 24  Number of attempts at approach: 1  Number of other approaches attempted: 0

## 2024-03-29 NOTE — ANESTHESIA PREPROCEDURE EVALUATION
PRE-OP EVALUATION    Patient Name: Jesse Tarango    Admit Diagnosis: Ventral hernia without obstruction or gangrene [K43.9]    Pre-op Diagnosis: Ventral hernia without obstruction or gangrene [K43.9]    ROBOTIC LAPAROSCOPIC VENTRAL HERNIA REPAIR WITH MESH (eTEP) EXTENDED TOTALLY EXTRAPERITONEAL REPAIR.    Anesthesia Procedure: ROBOTIC LAPAROSCOPIC VENTRAL HERNIA REPAIR WITH MESH (eTEP) EXTENDED TOTALLY EXTRAPERITONEAL REPAIR.    Surgeon(s) and Role:     * Houston Shipman MD - Primary    Pre-op vitals reviewed.  Temp: 97.9 °F (36.6 °C)  Pulse: 64  Resp: 16  BP: 131/85  SpO2: 98 %  Body mass index is 40.96 kg/m².    Current medications reviewed.  Hospital Medications:   ceFAZolin (Ancef) 3 g in sodium chloride 0.9% 100mL IVPB premix  3 g Intravenous Once    acetaminophen (Tylenol Extra Strength) tab 1,000 mg  1,000 mg Oral Once    scopolamine (Transderm-Scop) 1 MG/3DAYS patch 1 patch  1 patch Transdermal Once    glucose (Dex4) 15 GM/59ML oral liquid 15 g  15 g Oral Q15 Min PRN    Or    glucose (Glutose) 40% oral gel 15 g  15 g Oral Q15 Min PRN    Or    glucose-vitamin C (Dex-4) chewable tab 4 tablet  4 tablet Oral Q15 Min PRN    Or    dextrose 50% injection 50 mL  50 mL Intravenous Q15 Min PRN    Or    glucose (Dex4) 15 GM/59ML oral liquid 30 g  30 g Oral Q15 Min PRN    Or    glucose (Glutose) 40% oral gel 30 g  30 g Oral Q15 Min PRN    Or    glucose-vitamin C (Dex-4) chewable tab 8 tablet  8 tablet Oral Q15 Min PRN    lactated ringers infusion   Intravenous Continuous    [COMPLETED] heparin (Porcine) 5000 UNIT/ML injection 5,000 Units  5,000 Units Subcutaneous Once    heparin (Porcine) 5000 UNIT/ML injection        ceFAZolin in sodium chloride 0.9% (Ancef) 3-0.9 GM/100ML-% IVPB premix           Outpatient Medications:     Medications Prior to Admission   Medication Sig Dispense Refill Last Dose    pantoprazole 40 MG Oral Tab EC Take 1 tablet (40 mg total) by mouth before evening meal. 90 tablet 0 3/28/2024  at 2000    Empagliflozin (JARDIANCE) 25 MG Oral Tab Take 25 mg by mouth daily.   Past Week    Tirzepatide (MOUNJARO) 7.5 MG/0.5ML Subcutaneous Solution Pen-injector Inject 10 mg into the skin once a week. friday   3/15/2024    ALPRAZolam 0.5 MG Oral Tab Take 1 tablet (0.5 mg total) by mouth as needed. PRIOR TO FLIGHT   Past Week    amitriptyline 10 MG Oral Tab Take 1 tablet (10 mg total) by mouth nightly. TAKE AT BEDTIME   Past Week    Olmesartan Medoxomil 40 MG Oral Tab daily.   Past Week    KLOR-CON M20 20 MEQ Oral Tab CR daily.   3/29/2024 at 0330    Zolpidem Tartrate (AMBIEN) 10 MG Oral Tab Take 1 tablet (10 mg total) by mouth nightly as needed for Sleep.   Past Week    Atorvastatin Calcium (LIPITOR) 10 MG Oral Tab Take 4 tablets (40 mg total) by mouth nightly.   3/28/2024 at 2000    hydrocodone-acetaminophen (NORCO) 7.5-325 MG Oral Tab Take 1 tablet by mouth every 6 (six) hours as needed for Pain.   Past Month    Cyclobenzaprine HCl (CYCLOBENZAPRINE) 10 MG Oral Tab Take 1 tablet (10 mg total) by mouth 3 (three) times daily as needed for Muscle spasms.   Past Week    Atenolol-Chlorthalidone (TENORETIC) 100-25 MG Oral Tab Take 1 tablet by mouth daily.   3/29/2024 at 0330    Losartan Potassium (COZAAR) 100 MG Oral Tab Take 1 tablet (100 mg total) by mouth daily.   not taking    allopurinol (ZYLOPRIM) 300 MG Oral Tab Take 1 tablet (300 mg total) by mouth daily.   3/29/2024 at 0330    AmLODIPine Besylate (NORVASC) 10 MG Oral Tab Take 1 tablet (10 mg total) by mouth daily.   3/29/2024 at 0330    MetFORMIN HCl (GLUCOPHAGE) 1000 MG Oral Tab Take 1 tablet (1,000 mg total) by mouth 2 (two) times daily with meals.   3/28/2024 at 0600    SYNTHROID 125 MCG OR TABS take 1 tablet (175 mg ) DAILY   3/29/2024 at 0330    nitroglycerin 0.4 MG Sublingual SL Tab Place 1 tablet (0.4 mg total) under the tongue every 5 (five) minutes as needed for Chest pain.   More than a month    Ondansetron HCl (ZOFRAN) 4 mg tablet Take 1 tablet (4  mg total) by mouth every 8 (eight) hours as needed for Nausea.   More than a month       Allergies: Radiology contrast iodinated dyes, Percocet [oxycodone-acetaminophen], and Seldane [terfenadine]      Anesthesia Evaluation    Patient summary reviewed.    Anesthetic Complications  (-) history of anesthetic complications         GI/Hepatic/Renal      (+) GERD       (-) chronic renal disease   (-) liver disease                 Cardiovascular                (+) obesity  (+) hypertension   (+) hyperlipidemia  (-) CAD  (-) past MI                (-) angina     (-) WHITTAKER         Endo/Other      (+) diabetes  type 2, not using insulin  (+) hypothyroidism                       Pulmonary                    (+) sleep apnea and noncompliant      Neuro/Psych          (-) CVA   (-) TIA                         Past Surgical History:   Procedure Laterality Date    ARTHROSCOPY, KNEE, SURGICAL FOR IMPLANTATION OF CULTURED ANALOGOUS CHONDROCYTES Bilateral     CARPAL TUNNEL RELEASE      CHOLECYSTECTOMY  8/2013    Removed Ronquillo    COLONOSCOPY      ENDOVAS REPAIR, INFRARENL ABDOM AORTIC ANEURYSM/DISSECT  8/2018    Have not had corrected    HERNIA SURGERY  10/2013    After gall bladder    LAPAROSCOPIC CHOLECYSTECTOMY  10/20/2013    Procedure: LAPAROSCOPIC CHOLECYSTECTOMY;  Surgeon: VLADISLAV Matthews MD;  Location:  MAIN OR    UPPER GI ENDOSCOPY,EXAM  12/2023    EGD/biopsy     Social History     Socioeconomic History    Marital status:    Tobacco Use    Smoking status: Never   Vaping Use    Vaping Use: Never used   Substance and Sexual Activity    Alcohol use: Yes     Alcohol/week: 3.0 standard drinks of alcohol     Types: 3 Standard drinks or equivalent per week     Comment: rarely    Drug use: Never     History   Drug Use Unknown     Available pre-op labs reviewed.     Lab Results   Component Value Date     03/23/2024    K 3.3 (L) 03/23/2024     03/23/2024    CO2 27.0 03/23/2024    BUN 15 03/23/2024    CREATSERUM  1.09 03/23/2024     (H) 03/23/2024    CA 9.4 03/23/2024            Airway      Mallampati: II  Mouth opening: >3 FB  TM distance: > 6 cm  Neck ROM: full Cardiovascular    Cardiovascular exam normal.         Dental             Pulmonary    Pulmonary exam normal.                 Other findings              ASA: 2   Plan: general  NPO status verified and Patient does not meet NPO guidelines.  Patient has taken beta blockers in last 24 hours.  Post-procedure pain management plan discussed with surgeon and patient.    Comment: I spoke with the patient and discussed the risks of general anesthesia, which include allergic reaction, nausea, vomiting, dental injury, sore throat, awareness, hypotension, as well as more serious cardiac and pulmonary complications. The patient understands and consents to receiving general anesthesia for this procedure.     Plan/risks discussed with: patient and spouse                Present on Admission:  **None**

## 2024-03-29 NOTE — OPERATIVE REPORT
[facility]  Operative Note    Jesse Tarango Location: OR   [facility] [id number] MRN [id number]    8/15/1975 Age 48 year old   Admission Date 3/29/2024 Operation Date 3/29/2024   Attending Physician Houston Shipman MD Operating Physician Houston Shipman MD   PCP Hardeep Nicolas MD          Patient Name: Jesse Tarango    Preoperative Diagnosis: Ventral hernia without obstruction or gangrene [K43.9]    Postoperative Diagnosis: Same as pre-op diagnosis.    Primary Surgeon: Houston Shipman MD     Assistant: Aylin Parker PA-C    Anesthesia: General    Anesthesiologist: Anesthesiologist.: Angela Selby MD    Procedures: Robotic ventral hernia repair with mesh (rTAPP)     Implants: [place] Synecore Preperitoneal Mesh 15 cm x 20 cm     Specimen: none    Drains: none     Estimated Blood Loss: Blood Output: 5 mL (3/29/2024 12:02 PM)   =    Complications: None    Condition: Good    Indications for Surgery:   Jesse Tarango is a 48 year old presented who presents with a painful enlarging bulge in the epigastrium. Physical exam shows a ventral hernia. CT scan shows recurrent epigastric incisional hernia with incarcerated fat. The patient presents today for elective hernia repair.    Surgical Findings:   11 cm x 4 cm ventral hernia in the subxiphoid position     Description of Procedure:   The patient was transported to the operating room and placed on the operating table in supine position. General endotracheal anesthesia was administered. The abdomen was prepped and draped in sterile fashion. Pre-operative antibiotics were given. A time-out was performed.    In the right mid abdomen A 5mm incision was made Veress needle was placed into the cranial space and pneumoperitoneum established to a pressure of 15 mmHg.  A 5mm Optical port was placed with direct visualization of the abdominal wall layers.  Initial diagnostic survey reveals no injury secondary to our entry. The hernia is noted with incarcerated  fat in the subxiphoid area.. No other acute pathology is identified. Three 8 mm trocars were placed under direct visualization; 1 in the normal position just right of midline and 1 in the left flank out laterally the family trocar was then replaced with an 8 mm robotic trocar..  The robot was docked and instruments were placed under direct vision.       The hernia contents are reduced. An incision is made in the peritoneum and a combination of blunt dissection and electrocautery are used to dissect a preperitoneal pocket. Dissection was taken towards the hernia. The hernia sac was carefully dissected out of th defect, taking care to avoid creating defects in the peritoneum. The preperitoneal pocket is extended cephalad, caudad, and contralaterally to accommodate a mesh. Pneumoperitoneum is reduced to 12 mmHg. The hernia defect is closed primarily with running #1 Strattafix suture with good approximation. A [place] Synecore Preperitoneal Mesh 15 cm x 20 cm  mesh is placed. The mesh is initially approximated to the abdominal wall with the initial fascia closure stitch to ensure appropriate positioning. 2-0 Vicryl suture is used to affix the perimeter of the mesh to the anterior abdominal wall.  Additional sutures used to fix the midportion of the mesh to the anterior abdominal wall. The peritoneum flap is then closed with 2-0 V-Loc      All needles are recovered from the abdomen. The instruments were removed and pneumoperitoneum was released. All wounds were cleansed and irrigated and the skin is reapproximated using 4-0 Monocryl in subcuticular fashion. Local anesthetic was injected at all incision sites which were then covered with skin glue. The drapes were removed and the patient was awakened from anesthesia. The patient was transported to the recovery room in good condition, having tolerated the procedure well without apparent intraoperative complication. All needle, sponge, instrument counts were correct at the end  of procedure.     Houston Shipman MD   3/29/2024  12:17 PM

## 2024-03-29 NOTE — ANESTHESIA POSTPROCEDURE EVALUATION
[facility]    Jesse Tarango Patient Status:  Hospital Outpatient Surgery   Age/Gender 48 year old male MRN [id number]   Location [facility] SURGERY Attending Houston Shipman MD   Hosp Day # 0 PCP Hardeep Nicolas MD       Anesthesia Post-op Note    ROBOTIC LAPAROSCOPIC VENTRAL HERNIA REPAIR WITH MESH    Procedure Summary       Date: 03/29/24 Room / Location:  MAIN OR  /  MAIN OR    Anesthesia Start: 0731 Anesthesia Stop: 1235    Procedure: ROBOTIC LAPAROSCOPIC VENTRAL HERNIA REPAIR WITH MESH (Abdomen) Diagnosis:       Ventral hernia without obstruction or gangrene      (Ventral hernia without obstruction or gangrene [K43.9])    Surgeons: Houston Shipman MD Anesthesiologist: Angela Selby MD    Anesthesia Type: general ASA Status: 2            Anesthesia Type: general    Vitals Value Taken Time   /68 03/29/24 1232   Temp 98.5 03/29/24 1236   Pulse 80 03/29/24 1235   Resp 21 03/29/24 1235   SpO2 95 % 03/29/24 1235   Vitals shown include unfiled device data.    Patient Location: PACU    Anesthesia Type: general    Airway Patency: patent and extubated    Postop Pain Control: adequate    Mental Status: preanesthetic baseline    Nausea/Vomiting: none    Cardiopulmonary/Hydration status: stable euvolemic    Complications: no apparent anesthesia related complications    Postop vital signs: stable    Dental Exam: Unchanged from Preop    Patient to be discharged from PACU when criteria met.

## 2024-03-29 NOTE — DISCHARGE INSTRUCTIONS
Anh to infusion today for her first dose of Xolair. She arrives alert and independent. Feeling well today. Patient states medications were just verified with MD. Patient given [employer] handout and reviewed with patient possible side effects/adverse reaction. She verbalized understanding. Xolair given subcutaneously to bilateral upper outer arms. Patient tolerated well. Sites left open to air. Patient observed for 2 hours post injections. Patient without complaints, VSS, no s/s of adverse reaction noted or reported. Jose Gipson discharged in stable condition from infusion, with AVS of future appointments scheduled. Home Care Instructions  Robot-Assisted Laparoscopic Ventral Hernia Repair  Dr. Houston Shipman    WHAT TO EXPECT  You may feel pain at the incisions or where your hernia used to be. This is due to stitches placed during the surgery.    You may feel pain in the shoulders. This is due to irritation of the diaphragm by the air used to inflate the abdomen.    You may feel a sore throat. This is due to the breathing tube used during surgery.     You may feel mild nausea and vomiting for the first 24 hours, this should resolve quickly.    You may have constipation, especially if taking narcotic pain medications. If you have not had a bowel movement by 48 hours after surgery, take Miralax 17g (one cap full) every 12 hours until you have a bowel movement. If another 24 hours goes by without a bowel movement, then take a dose of magnesium citrate or milk of magnesia.     MEDICATIONS  Take 2 Extra Strength Tylenol (1000mg every) 8 hours for pain. For the first 3 days it is best to take the Tylenol every 8 hours even if you do not feel much pain.     For moderate to severe pain take one Oxycodone pill (5mg) every six hours as needed for pain. If you do not feel that narcotics are necessary you shouldn’t take them. If the pain is severe you can take two pills (10mg) every six hours.    Take Toradol (10mg) every 6 hours for the first 3 days .    After you finish the Toradol, you can take Advil (ibuprofen) 800mg every 8 hours or Alleve (naproxen) 500mg every 12 hours. Next ibuprofen dose on or after 4:50pm.    Please ask your surgeon before resuming blood thinners such as Aspirin, Plavix, Coumadin, Warfarin, Eliquis, or Xarelto. All other home medications may be resumed as scheduled.     DIET  Start with a light and bland diet and slowly advance to regular food as your appetite improves. There are no specific food restrictions. Do not eat excessively. Eat small frequent meals.     Drink plenty of water. Try to eat a healthy high  fiber diet.    Do not drink alcohol (beer, wine, liquor) or use tobacco products.    WOUND CARE  The incisions are covered with skin glue. You can shower 24 hours after surgery and get the dressings wet.    The skin glue will stay on for 10 to 14 days after surgery.     Soap and water can get on the incisions but do not scrub the wounds. No hair dye or chemicals of any kind should get on the incisions.     Do not apply any topical ointments such as Neosporin or Hydrogen Peroxide.    Do not swim or submerge the incisions under water for 1 month.    ACTIVITY  Every day you should be up walking around the house. Do not lie in bed all day. Staying active prevents blood clots and pneumonia.    You can go up and down stairs. Do not lift more than 20 pounds or perform strenuous activity that requires straining the core muscles.    You may ride in a car but should not drive the car for at least one week.     APPOINTMENT  Please call our office at [phone] soon to make an appointment.    For questions or concerns please call our office between 8:30 a.m. and 5 p.m. Monday through Friday. The number above directs to the answering service after hours to reach the on-call physician.    Please call our office immediately for fever greater than 100.5, excess bleeding, inability to urinate, severe abdominal pain, severe diarrhea, uncontrollable vomiting.      For life threatening emergencies such as severe chest pain, difficulty breathing, or loss of conciousness call 911.

## 2024-03-29 NOTE — H&P
Follow Up Visit Note     Active Problems      1. Ventral hernia without obstruction or gangrene          Chief Complaint        Chief Complaint   Patient presents with    [facility]       EP- 1/12 CT Abd/Pel Review             History of Present Illness  48 year old male who is here for evaluation of a ventral hernia. He underwent a Ct scan of the abdomen and pelvis . He continues to have a prominent bulge in the epigastric area with pain. Pain is continuous and moderate in severity.         Allergies  Jesse is allergic to radiology contrast iodinated dyes, percocet [oxycodone-acetaminophen], and seldane [terfenadine].     Past Medical / Surgical / Social / Family History    The past medical and past surgical history have been reviewed by me today.          Past Medical History:   Diagnosis Date    Abdominal pain 11/2022     Lower back pain and cramping    Anxiety 8/2018     When flying    Arthritis 1/2021     Legs and back    Back pain 1/2021     Legs and back    Belching      Bloating      Calculus of kidney 2/2015     Stones    Diabetes mellitus (HCC)      Disorder of thyroid      Esophageal reflux      Food intolerance 3/2022     Dairy intolerance    Gout      Heartburn 6/2014    High blood pressure      High cholesterol      History of cardiac murmur 8/2018     Heart attack    IBS (irritable bowel syndrome)      Indigestion      Loss of appetite      Nausea 11/2022     Felt like flu long duration    Osteoarthritis      Other and unspecified hyperlipidemia      Pain in joints 1/2021     Legs and back    Sleep apnea 10/2012     Apnea    Thyroid disease      Type II or unspecified type diabetes mellitus without mention of complication, not stated as uncontrolled      Unspecified essential hypertension      Visual impairment              Past Surgical History:   Procedure Laterality Date    ARTHROSCOPY, KNEE, SURGICAL FOR IMPLANTATION OF CULTURED ANALOGOUS CHONDROCYTES Bilateral      CARPAL TUNNEL RELEASE         CHOLECYSTECTOMY   2013     Removed [place]    COLONOSCOPY        ENDOVAS REPAIR, INFRARENL ABDOM AORTIC ANEURYSM/DISSECT   2018     Have not had corrected    HERNIA SURGERY   10/2013     After gall bladder    LAPAROSCOPIC CHOLECYSTECTOMY   10/20/2013     Procedure: LAPAROSCOPIC CHOLECYSTECTOMY;  Surgeon: VLADISLAV Matthews MD;  Location:  MAIN OR    UPPER GI ENDOSCOPY,EXAM             The family history and social history have been reviewed by me today.           Family History   Problem Relation Age of Onset    Hypertension Father      Heart Disorder Father      Diabetes Father               Heart Attack Father               Mental Disorder Mother      Hypertension Mother      Heart Disorder Mother      Diabetes Mother               Heart Attack Mother           Aeortic and Mitril Valve Replacement, TAVR Aeortic    Heart Disorder Maternal Grandmother      Heart Disorder Maternal Grandfather      Mental Disorder Sister      Heart Attack Sister      Stroke Sister        Social History            Socioeconomic History    Marital status:    Tobacco Use    Smoking status: Never   Vaping Use    Vaping Use: Never used   Substance and Sexual Activity    Alcohol use: Yes       Alcohol/week: 3.0 standard drinks of alcohol       Types: 3 Standard drinks or equivalent per week       Comment: rarely    Drug use: Never         Current Outpatient Medications:     pantoprazole 40 MG Oral Tab EC, Take 1 tablet (40 mg total) by mouth before evening meal., Disp: 90 tablet, Rfl: 0    Empagliflozin (JARDIANCE) 25 MG Oral Tab, Take 25 mg by mouth daily., Disp: , Rfl:     Tirzepatide (MOUNJARO) 7.5 MG/0.5ML Subcutaneous Solution Pen-injector, Inject 7.5 mg into the skin once a week. friday, Disp: , Rfl:     nitroglycerin 0.4 MG Sublingual SL Tab, Place 1 tablet (0.4 mg total) under the tongue every 5 (five) minutes as needed for Chest pain., Disp: , Rfl:     ALPRAZolam 0.5 MG Oral Tab, Take 1 tablet  (0.5 mg total) by mouth as needed. PRIOR TO FLIGHT, Disp: , Rfl:     amitriptyline 10 MG Oral Tab, Take 1 tablet (10 mg total) by mouth nightly. TAKE AT BEDTIME, Disp: , Rfl:     Olmesartan Medoxomil 40 MG Oral Tab, , Disp: , Rfl:     KLOR-CON M20 20 MEQ Oral Tab CR, , Disp: , Rfl:     Ondansetron HCl (ZOFRAN) 4 mg tablet, Take 1 tablet (4 mg total) by mouth every 8 (eight) hours as needed for Nausea., Disp: , Rfl:     Zolpidem Tartrate (AMBIEN) 10 MG Oral Tab, Take 1 tablet (10 mg total) by mouth nightly as needed for Sleep., Disp: , Rfl:     Atorvastatin Calcium (LIPITOR) 10 MG Oral Tab, Take 4 tablets (40 mg total) by mouth nightly., Disp: , Rfl:     hydrocodone-acetaminophen (NORCO) 7.5-325 MG Oral Tab, Take 1 tablet by mouth every 6 (six) hours as needed for Pain., Disp: , Rfl:     Cyclobenzaprine HCl (CYCLOBENZAPRINE) 10 MG Oral Tab, Take 1 tablet (10 mg total) by mouth 3 (three) times daily as needed for Muscle spasms., Disp: , Rfl:     Atenolol-Chlorthalidone (TENORETIC) 100-25 MG Oral Tab, Take 1 tablet by mouth daily., Disp: , Rfl:     Losartan Potassium (COZAAR) 100 MG Oral Tab, Take 1 tablet (100 mg total) by mouth daily., Disp: , Rfl:     allopurinol (ZYLOPRIM) 300 MG Oral Tab, Take 1 tablet (300 mg total) by mouth daily., Disp: , Rfl:     AmLODIPine Besylate (NORVASC) 10 MG Oral Tab, Take 1 tablet (10 mg total) by mouth daily., Disp: , Rfl:     MetFORMIN HCl (GLUCOPHAGE) 1000 MG Oral Tab, Take 1 tablet (1,000 mg total) by mouth 2 (two) times daily with meals., Disp: , Rfl:     SYNTHROID 125 MCG OR TABS, take 1 tablet (175 mg ) DAILY, Disp: , Rfl:      Review of Systems  The Review of Systems has been reviewed by me during today.  Review of Systems   Constitutional:  Negative for chills, diaphoresis, fatigue and fever.   HENT:  Negative for ear discharge, ear pain and sore throat.    Eyes:  Negative for pain and discharge.   Respiratory:  Negative for cough, chest tightness and shortness of breath.     Cardiovascular:  Negative for chest pain, palpitations and leg swelling.   Gastrointestinal:  Positive for abdominal pain. Negative for abdominal distention, blood in stool, constipation, diarrhea, nausea and vomiting.   Genitourinary:  Negative for dysuria, frequency, hematuria and urgency.   Skin:  Negative for color change, pallor and rash.   Neurological:  Negative for weakness, light-headedness, numbness and headaches.   Hematological:  Negative for adenopathy. Does not bruise/bleed easily.   Psychiatric/Behavioral:  Negative for agitation and confusion.          Physical Findings   Pulse 77   Temp 96.6 °F (35.9 °C) (Temporal)   Physical Exam  Constitutional:       Appearance: Normal appearance.   HENT:      Head: Normocephalic and atraumatic.   Cardiovascular:      Pulses: Normal pulses.   Pulmonary:      Effort: Pulmonary effort is normal.   Abdominal:           Comments: Soft, tender in the epigastric area, palpable ventral hernia with incarcerated fat, otherwise the abdomen is otherwise non tender, non distended, no rebound tenderness or guarding   Skin:     General: Skin is warm.      Capillary Refill: Capillary refill takes less than 2 seconds.   Neurological:      Mental Status: He is alert and oriented to person, place, and time. Mental status is at baseline.               Assessment/Plan  1. Ventral hernia without obstruction or gangrene          Jesse Tarango is a 48 year old male referred by Dr. Nicolas for evaluation of ventral hernia. I obtained a CT scan to better delineate the abdominal wall anatomy. There is a recurrent abdominal wall hernia in the epigastric area with a migrated mesh. There is incarcerated fat within the defect. It correlates exactly with the area of the patients complains of pain and bulge. I discussed with him robotic repair of the ventral hernia. I explained the risks and benefits of surgery including but not limited to the risks of bleeding , infection, mesh  infection, mesh complications, and recurrence. Patient understands the risks and would like to proceed with proposed procedure.         No orders of the defined types were placed in this encounter.        Imaging & Referrals   None     Follow Up  No follow-ups on file.     Houston Shipman MD

## 2024-03-29 NOTE — ANESTHESIA PROCEDURE NOTES
[facility]ional [facility]    Date/Time: 3/29/2024 12:15 PM    Performed by: Angela Selby MD  Authorized by: Angela Selby MD      General Information and Staff    Start Time:  3/29/2024 12:15 PM  End Time:  3/29/2024 12:21 PM  Anesthesiologist:  Angela Selby MD  Performed by:  Anesthesiologist  Patient Location:  OR    Block Placement: Post Induction  Site Identification: real time ultrasound guided and image stored and retrievable    Block site/laterality marked before start: site marked  Reason for Block: at surgeon's request and post-op pain management    Preanesthetic Checklist: 2 patient identifers, IV checked, risks and benefits discussed, monitors and equipment checked, pre-op evaluation, timeout performed, anesthesia consent, sterile technique used, no prohibitive neurological deficits and no local skin infection at insertion site      Procedure Details    Patient Position:  Supine  Prep: ChloraPrep    Monitoring:  Cardiac monitor, continuous pulse ox and blood pressure cuff  Block Type:  TAP  Laterality:  Bilateral  Injection Technique:  Single-shot    Needle    Needle Type:  Short-bevel and echogenic  Needle Gauge:  21 G  Needle Length:  100 mm  Needle Localization:  Ultrasound guidance  Reason for Ultrasound Use: appropriate spread of the medication was noted in real time and no ultrasound evidence of intravascular and/or intraneural injection            Assessment    Injection Assessment:  Good spread noted, negative resistance, negative aspiration for heme, incremental injection and low pressure  Heart Rate Change: No    - Patient tolerated block procedure well without evidence of immediate block related complications.     Medications  3/29/2024 12:15 PM      Additional Comments    Medication:  Ropivacaine 0.25% 30mL x2

## 2024-04-12 ENCOUNTER — OFFICE VISIT (OUTPATIENT)
Facility: LOCATION | Age: 49
End: 2024-04-12
Payer: COMMERCIAL

## 2024-04-12 VITALS — HEART RATE: 86 BPM | TEMPERATURE: 97 F

## 2024-04-12 DIAGNOSIS — Z98.890 POST-OPERATIVE STATE: Primary | ICD-10-CM

## 2024-04-12 PROCEDURE — 99212 OFFICE O/P EST SF 10 MIN: CPT | Performed by: PHYSICIAN ASSISTANT

## 2024-04-12 NOTE — PROGRESS NOTES
Post Operative Visit Note       Active Problems  1. Post-operative state         Chief Complaint   Chief Complaint   Patient presents with    Post-Op     PO 3/29 ROBOTIC LAPAROSCOPIC VENTRAL HERNIA REPAIR WITH MESH W/ДМИТРИЙ           History of Present Illness   48 year old male who is here for post-op robotic laparoscopic ventral hernia repair with mesh on 3/29 with Dr. Shipman     The patient reports doing well postoperatively. H denies fever or chills, nausea or vomiting. He reports improvement with bowel movements. Patient doesn't have any questions or concerns.        Allergies  Jesse is allergic to radiology contrast iodinated dyes, percocet [oxycodone-acetaminophen], and seldane [terfenadine].    Past Medical / Surgical / Social / Family History    The past medical and past surgical history have been reviewed by me today.     Past Medical History:    Abdominal pain    Lower back pain and cramping    Anxiety    When flying    Arthritis    Legs and back    Back pain    Legs and back    Belching    Bloating    Calculus of kidney    Stones    Diabetes mellitus (HCC)    Disorder of thyroid    Esophageal reflux    Food intolerance    Dairy intolerance    Gout    Heartburn    High blood pressure    High cholesterol    History of cardiac murmur    Heart attack    IBS (irritable bowel syndrome)    Indigestion    Loss of appetite    Nausea    Felt like flu long duration    Osteoarthritis    Other and unspecified hyperlipidemia    Pain in joints    Legs and back    Sleep apnea    Apnea    Thyroid disease    Type II or unspecified type diabetes mellitus without mention of complication, not stated as uncontrolled    Unspecified essential hypertension    Visual impairment     Past Surgical History:   Procedure Laterality Date    Arthroscopy, knee, surgical for implantation of cultured analogous chondrocytes Bilateral     Carpal tunnel release      Cholecystectomy  8/2013    Removed Yg Rueda      Endovas  repair, infrarenl abdom aortic aneurysm/dissect  2018    Have not had corrected    Hernia surgery  10/2013    After gall bladder    Laparoscopic cholecystectomy  10/20/2013    Procedure: LAPAROSCOPIC CHOLECYSTECTOMY;  Surgeon: VLADISLAV Matthews MD;  Location:  MAIN OR    Upper gi endoscopy,exam  2023    EGD/biopsy       The family history and social history have been reviewed by me today.    Family History   Problem Relation Age of Onset    Hypertension Father     Heart Disorder Father     Diabetes Father             Heart Attack Father             Mental Disorder Mother     Hypertension Mother     Heart Disorder Mother     Diabetes Mother             Heart Attack Mother         Aeortic and Mitril Valve Replacement, TAVR Aeortic    Heart Disorder Maternal Grandmother     Heart Disorder Maternal Grandfather     Mental Disorder Sister     Heart Attack Sister     Stroke Sister      Social History     Socioeconomic History    Marital status:    Tobacco Use    Smoking status: Never   Vaping Use    Vaping status: Never Used   Substance and Sexual Activity    Alcohol use: Yes     Alcohol/week: 3.0 standard drinks of alcohol     Types: 3 Standard drinks or equivalent per week     Comment: rarely    Drug use: Never        Current Outpatient Medications:     Empagliflozin (JARDIANCE) 25 MG Oral Tab, Take 25 mg by mouth daily., Disp: , Rfl:     Tirzepatide (MOUNJARO) 7.5 MG/0.5ML Subcutaneous Solution Pen-injector, Inject 10 mg into the skin once a week. friday, Disp: , Rfl:     nitroglycerin 0.4 MG Sublingual SL Tab, Place 1 tablet (0.4 mg total) under the tongue every 5 (five) minutes as needed for Chest pain., Disp: , Rfl:     ALPRAZolam 0.5 MG Oral Tab, Take 1 tablet (0.5 mg total) by mouth as needed. PRIOR TO FLIGHT, Disp: , Rfl:     amitriptyline 10 MG Oral Tab, Take 1 tablet (10 mg total) by mouth nightly. TAKE AT BEDTIME, Disp: , Rfl:     Olmesartan Medoxomil 40 MG Oral Tab,  daily., Disp: , Rfl:     KLOR-CON M20 20 MEQ Oral Tab CR, daily., Disp: , Rfl:     Ondansetron HCl (ZOFRAN) 4 mg tablet, Take 1 tablet (4 mg total) by mouth every 8 (eight) hours as needed for Nausea., Disp: , Rfl:     Zolpidem Tartrate (AMBIEN) 10 MG Oral Tab, Take 1 tablet (10 mg total) by mouth nightly as needed for Sleep., Disp: , Rfl:     Atorvastatin Calcium (LIPITOR) 10 MG Oral Tab, Take 4 tablets (40 mg total) by mouth nightly., Disp: , Rfl:     hydrocodone-acetaminophen (NORCO) 7.5-325 MG Oral Tab, Take 1 tablet by mouth every 6 (six) hours as needed for Pain., Disp: , Rfl:     Cyclobenzaprine HCl (CYCLOBENZAPRINE) 10 MG Oral Tab, Take 1 tablet (10 mg total) by mouth 3 (three) times daily as needed for Muscle spasms., Disp: , Rfl:     Atenolol-Chlorthalidone (TENORETIC) 100-25 MG Oral Tab, Take 1 tablet by mouth daily., Disp: , Rfl:     Losartan Potassium (COZAAR) 100 MG Oral Tab, Take 1 tablet (100 mg total) by mouth daily., Disp: , Rfl:     allopurinol (ZYLOPRIM) 300 MG Oral Tab, Take 1 tablet (300 mg total) by mouth daily., Disp: , Rfl:     AmLODIPine Besylate (NORVASC) 10 MG Oral Tab, Take 1 tablet (10 mg total) by mouth daily., Disp: , Rfl:     MetFORMIN HCl (GLUCOPHAGE) 1000 MG Oral Tab, Take 1 tablet (1,000 mg total) by mouth 2 (two) times daily with meals., Disp: , Rfl:     SYNTHROID 125 MCG OR TABS, take 1 tablet (175 mg ) DAILY, Disp: , Rfl:       Review of Systems  A 10 point Review of Systems has been completed by me today and is negative except as above in the HPI.    Physical Findings   Pulse 86   Temp 97.3 °F (36.3 °C) (Temporal)   Gen/psych: alert and oriented, cooperative, no apparent distress  Cardiovascular: regular rate and rhythm   Respiratory: respirations unlabored, no wheeze  Abdominal: soft, non-tender, non-distended, no guarding/rebound  Incisions: clean, dry, no tenderness or erythema         Assessment/Plan  1. Post-operative state        No restrictions on diet  Be cautious  with lift restrictions no more than 20 lbs for 6 weeks  Follow up in 2 month with Dr. Ramonita EMERSON-S     No orders of the defined types were placed in this encounter.       Imaging & Referrals   None    The patient is doing well following ventral hernia repair.  He denies nausea or vomiting.  He denies fever or chills.  He reports mild incisional pain.  Abdomen: Abdomen is soft, nondistended, nontender to palpation.  Incisions are healing well.  There is healing ecchymosis.  Assessment:  Status post ventral hernia repair.  Plan:  The patient is to continue with diet as tolerated.  The patient is to continue with lifting restriction of no more than 20 pounds for 6 weeks from the date of his surgery.  All questions or concerns were answered.  He is return to the clinic in 2 months for follow-up with Dr. Shipman, sooner if needed.    Desiree Field PA-C  [facility]  4/12/2024  10:18 AM

## 2024-04-30 ENCOUNTER — HOSPITAL ENCOUNTER (EMERGENCY)
Facility: HOSPITAL | Age: 49
Discharge: HOME | End: 2024-05-01
Attending: EMERGENCY MEDICINE
Payer: COMMERCIAL

## 2024-04-30 VITALS
OXYGEN SATURATION: 98 % | RESPIRATION RATE: 20 BRPM | HEART RATE: 96 BPM | WEIGHT: 315 LBS | SYSTOLIC BLOOD PRESSURE: 122 MMHG | TEMPERATURE: 98 F | BODY MASS INDEX: 40 KG/M2 | DIASTOLIC BLOOD PRESSURE: 83 MMHG

## 2024-04-30 DIAGNOSIS — M54.9 BACK PAIN WITHOUT RADIATION: Primary | ICD-10-CM

## 2024-04-30 LAB
APTT PPP: 27.2 SECONDS (ref 23.3–35.6)
BASOPHILS # BLD AUTO: 0.03 X10(3) UL (ref 0–0.2)
BASOPHILS NFR BLD AUTO: 0.3 %
EOSINOPHIL # BLD AUTO: 0.48 X10(3) UL (ref 0–0.7)
EOSINOPHIL NFR BLD AUTO: 4.8 %
ERYTHROCYTE [DISTWIDTH] IN BLOOD BY AUTOMATED COUNT: 13.6 %
HCT VFR BLD AUTO: 43.6 %
HGB BLD-MCNC: 15.1 G/DL
IMM GRANULOCYTES # BLD AUTO: 0.03 X10(3) UL (ref 0–1)
IMM GRANULOCYTES NFR BLD: 0.3 %
INR BLD: 0.99 (ref 0.8–1.2)
LYMPHOCYTES # BLD AUTO: 3.72 X10(3) UL (ref 1–4)
LYMPHOCYTES NFR BLD AUTO: 37 %
MCH RBC QN AUTO: 28.9 PG (ref 26–34)
MCHC RBC AUTO-ENTMCNC: 34.6 G/DL (ref 31–37)
MCV RBC AUTO: 83.4 FL
MONOCYTES # BLD AUTO: 0.91 X10(3) UL (ref 0.1–1)
MONOCYTES NFR BLD AUTO: 9.1 %
NEUTROPHILS # BLD AUTO: 4.88 X10 (3) UL (ref 1.5–7.7)
NEUTROPHILS # BLD AUTO: 4.88 X10(3) UL (ref 1.5–7.7)
NEUTROPHILS NFR BLD AUTO: 48.5 %
PLATELET # BLD AUTO: 166 10(3)UL (ref 150–450)
PROTHROMBIN TIME: 13.1 SECONDS (ref 11.6–14.8)
RBC # BLD AUTO: 5.23 X10(6)UL
WBC # BLD AUTO: 10.1 X10(3) UL (ref 4–11)

## 2024-04-30 PROCEDURE — 96361 HYDRATE IV INFUSION ADD-ON: CPT

## 2024-04-30 PROCEDURE — 85025 COMPLETE CBC W/AUTO DIFF WBC: CPT | Performed by: EMERGENCY MEDICINE

## 2024-04-30 PROCEDURE — 85730 THROMBOPLASTIN TIME PARTIAL: CPT | Performed by: EMERGENCY MEDICINE

## 2024-04-30 PROCEDURE — 81003 URINALYSIS AUTO W/O SCOPE: CPT | Performed by: EMERGENCY MEDICINE

## 2024-04-30 PROCEDURE — 96374 THER/PROPH/DIAG INJ IV PUSH: CPT

## 2024-04-30 PROCEDURE — 99284 EMERGENCY DEPT VISIT MOD MDM: CPT

## 2024-04-30 PROCEDURE — 84484 ASSAY OF TROPONIN QUANT: CPT | Performed by: EMERGENCY MEDICINE

## 2024-04-30 PROCEDURE — 83690 ASSAY OF LIPASE: CPT | Performed by: EMERGENCY MEDICINE

## 2024-04-30 PROCEDURE — 80053 COMPREHEN METABOLIC PANEL: CPT | Performed by: EMERGENCY MEDICINE

## 2024-04-30 PROCEDURE — 96375 TX/PRO/DX INJ NEW DRUG ADDON: CPT

## 2024-04-30 PROCEDURE — 99285 EMERGENCY DEPT VISIT HI MDM: CPT

## 2024-04-30 PROCEDURE — 85610 PROTHROMBIN TIME: CPT | Performed by: EMERGENCY MEDICINE

## 2024-04-30 RX ORDER — SODIUM CHLORIDE 9 MG/ML
125 INJECTION, SOLUTION INTRAVENOUS CONTINUOUS
Status: DISCONTINUED | OUTPATIENT
Start: 2024-04-30 | End: 2024-05-01

## 2024-04-30 RX ORDER — ONDANSETRON 2 MG/ML
4 INJECTION INTRAMUSCULAR; INTRAVENOUS ONCE
Status: COMPLETED | OUTPATIENT
Start: 2024-04-30 | End: 2024-04-30

## 2024-04-30 RX ORDER — MORPHINE SULFATE 4 MG/ML
4 INJECTION, SOLUTION INTRAMUSCULAR; INTRAVENOUS EVERY 30 MIN PRN
Status: DISCONTINUED | OUTPATIENT
Start: 2024-04-30 | End: 2024-05-01

## 2024-05-01 ENCOUNTER — APPOINTMENT (OUTPATIENT)
Dept: CT IMAGING | Facility: HOSPITAL | Age: 49
End: 2024-05-01
Attending: EMERGENCY MEDICINE
Payer: COMMERCIAL

## 2024-05-01 LAB
ALBUMIN SERPL-MCNC: 3.8 G/DL (ref 3.4–5)
ALBUMIN/GLOB SERPL: 1 {RATIO} (ref 1–2)
ALP LIVER SERPL-CCNC: 113 U/L
ALT SERPL-CCNC: 36 U/L
ANION GAP SERPL CALC-SCNC: 5 MMOL/L (ref 0–18)
AST SERPL-CCNC: 24 U/L (ref 15–37)
BILIRUB SERPL-MCNC: 0.6 MG/DL (ref 0.1–2)
BILIRUB UR QL STRIP.AUTO: NEGATIVE
BUN BLD-MCNC: 16 MG/DL (ref 9–23)
CALCIUM BLD-MCNC: 9.5 MG/DL (ref 8.5–10.1)
CHLORIDE SERPL-SCNC: 106 MMOL/L (ref 98–112)
CLARITY UR REFRACT.AUTO: CLEAR
CO2 SERPL-SCNC: 28 MMOL/L (ref 21–32)
CREAT BLD-MCNC: 0.97 MG/DL
EGFRCR SERPLBLD CKD-EPI 2021: 95 ML/MIN/1.73M2 (ref 60–?)
GLOBULIN PLAS-MCNC: 4 G/DL (ref 2.8–4.4)
GLUCOSE BLD-MCNC: 131 MG/DL (ref 70–99)
GLUCOSE UR STRIP.AUTO-MCNC: >1000 MG/DL
KETONES UR STRIP.AUTO-MCNC: NEGATIVE MG/DL
LEUKOCYTE ESTERASE UR QL STRIP.AUTO: NEGATIVE
LIPASE SERPL-CCNC: 44 U/L (ref 13–75)
NITRITE UR QL STRIP.AUTO: NEGATIVE
OSMOLALITY SERPL CALC.SUM OF ELEC: 291 MOSM/KG (ref 275–295)
PH UR STRIP.AUTO: 5.5 [PH] (ref 5–8)
POTASSIUM SERPL-SCNC: 3.5 MMOL/L (ref 3.5–5.1)
PROT SERPL-MCNC: 7.8 G/DL (ref 6.4–8.2)
PROT UR STRIP.AUTO-MCNC: NEGATIVE MG/DL
RBC UR QL AUTO: NEGATIVE
SODIUM SERPL-SCNC: 139 MMOL/L (ref 136–145)
SP GR UR STRIP.AUTO: >1.03 (ref 1–1.03)
TROPONIN I SERPL HS-MCNC: <3 NG/L
UROBILINOGEN UR STRIP.AUTO-MCNC: NORMAL MG/DL

## 2024-05-01 PROCEDURE — 74176 CT ABD & PELVIS W/O CONTRAST: CPT | Performed by: EMERGENCY MEDICINE

## 2024-05-01 PROCEDURE — 71250 CT THORAX DX C-: CPT | Performed by: EMERGENCY MEDICINE

## 2024-05-01 NOTE — ED PROVIDER NOTES
Patient Seen in: [facility] Emergency Department      History     Chief Complaint   Patient presents with    Back Pain     Hernia surgery 1 month ago     Stated Complaint: Back pain    Subjective:   HPI    Patient is a 48-year-old male presenting to the emergency department for back pain.  This is actually pain that he has had since his surgery since coming out of surgery right mid back pain for a month now.  Tonight his pain is getting markedly worse prompting his visit here it is not related to movement or position but no significant cough or cold he did have hernia surgery a month ago when this all started.  He denies any other exacerbating relieving factors or associated symptoms.    Objective:   Past Medical History:    Abdominal pain    Lower back pain and cramping    Anxiety    When flying    Arthritis    Legs and back    Back pain    Legs and back    Belching    Bloating    Calculus of kidney    Stones    Diabetes mellitus (HCC)    Disorder of thyroid    Esophageal reflux    Food intolerance    Dairy intolerance    Gout    Heartburn    High blood pressure    High cholesterol    History of cardiac murmur    Heart attack    IBS (irritable bowel syndrome)    Indigestion    Loss of appetite    Nausea    Felt like flu long duration    Osteoarthritis    Other and unspecified hyperlipidemia    Pain in joints    Legs and back    Sleep apnea    Apnea    Thyroid disease    Type II or unspecified type diabetes mellitus without mention of complication, not stated as uncontrolled    Unspecified essential hypertension    Visual impairment              Past Surgical History:   Procedure Laterality Date    Arthroscopy, knee, surgical for implantation of cultured analogous chondrocytes Bilateral     Carpal tunnel release      Cholecystectomy  8/2013    Removed [place]    Colonoscopy      Endovas repair, infrarenl abdom aortic aneurysm/dissect  8/2018    Have not had corrected    Hernia surgery  10/2013    After gall  bladder    Laparoscopic cholecystectomy  10/20/2013    Procedure: LAPAROSCOPIC CHOLECYSTECTOMY;  Surgeon: VLADISLAV Matthews MD;  Location: [facility] MAIN OR    Repair ing hernia,3+y/o,reducibl      Upper gi endoscopy,exam  12/2023    EGD/biopsy                Social History     Socioeconomic History    Marital status:    Tobacco Use    Smoking status: Never   Vaping Use    Vaping status: Never Used   Substance and Sexual Activity    Alcohol use: Yes     Alcohol/week: 3.0 standard drinks of alcohol     Types: 3 Standard drinks or equivalent per week     Comment: rarely    Drug use: Never              Review of Systems    Positive for stated complaint: Back pain  Other systems are as noted in HPI.  Constitutional and vital signs reviewed.      All other systems reviewed and negative except as noted above.    Physical Exam     ED Triage Vitals [04/30/24 2231]   /83   Pulse 96   Resp 20   Temp 97.8 °F (36.6 °C)   Temp src Temporal   SpO2 98 %   O2 Device None (Room air)       Current:/83   Pulse 96   Temp 97.8 °F (36.6 °C) (Temporal)   Resp 20   Wt (!) 142.9 kg   SpO2 98%   BMI 40.44 kg/m²         Physical Exam  Awake alert patient appears no distress HEENT exam is normal lungs are clear cardiovascular exam shows regular rhythm abdomen soft nontender extremities no Cyanosis or edema no rash back exam is normal no focal deficits       ED Course     Labs Reviewed   COMP METABOLIC PANEL (14) - Abnormal; Notable for the following components:       Result Value    Glucose 131 (*)     All other components within normal limits   URINALYSIS, ROUTINE - Abnormal; Notable for the following components:    Spec Gravity >1.030 (*)     Glucose Urine >1000 (*)     All other components within normal limits   TROPONIN I HIGH SENSITIVITY - Normal   PROTHROMBIN TIME (PT) - Normal   PTT, ACTIVATED - Normal   LIPASE - Normal   CBC WITH DIFFERENTIAL WITH PLATELET    Narrative:     The following orders were created for panel  order CBC With Differential With Platelet.  Procedure                               Abnormality         Status                     ---------                               -----------         ------                     CBC W/ DIFFERENTIAL[[id number]]                              Final result                 Please view results for these tests on the individual orders.   RAINBOW DRAW BLUE   RAINBOW DRAW LIGHT GREEN   RAINBOW DRAW LAVENDER   CBC W/ DIFFERENTIAL             Differential diagnosis includes pneumonia, pneumothorax, muscle strain         MDM                                         Medical Decision Making  48-year-old male presenting to the emergency department for back pain IV established cardiac monitor shows a sinus rhythm pulse ox shows no signs of hypoxia.  CBC shows a stable hemoglobin level metabolic panel stable renal function liver enzymes lipase no elevation negative NSTEMI.  Troponin shows no elevation indicative of NSTEMI.  Independent interpretation by ED physician CT scan shows no pneumothorax no pneumonia and no obstruction.  Patient has Vicodin at home for pain control I recommended close follow-up with primary care doctor patient is to return to the emergency department for worsening symptoms other complaints  The patient was screened and evaluated during this visit.  As a treating physician attending to the patient, I determined, within reasonable clinical confidence and prior to discharge, that an emergency medical condition was not or was no longer present.  There was no indication for further evaluation, treatment or admission on an emergency basis.    The usual and customary discharge instructions were discussed given the patient's ER course.  We discussed signs and symptoms that should prompt the patient's immediate return to the emergency department.  Reasonable over-the-counter and prescription treatment options and physician follow-up plan was discussed.  Patient was discharged home  in good condition  This note was prepared using Dragon Medical voice recognition dictation software.  As a result errors may occur.  When identified to these areas have been corrected.  While every attempt is made to correct errors during dictation discrepancies may still exist.  Please contact if there are any errors    Problems Addressed:  Back pain without radiation: acute illness or injury    Amount and/or Complexity of Data Reviewed  Labs: ordered. Decision-making details documented in ED Course.  Radiology: ordered and independent interpretation performed. Decision-making details documented in ED Course.  ECG/medicine tests: ordered and independent interpretation performed. Decision-making details documented in ED Course.        Disposition and Plan     Clinical Impression:  1. Back pain without radiation         Disposition:  Discharge  5/1/2024  1:17 am    Follow-up:  Hardeep Nicolas MD  [address] [place] [id number]  [phone]    Follow up in 1 week(s)            Medications Prescribed:  Current Discharge Medication List

## 2024-05-01 NOTE — ED INITIAL ASSESSMENT (HPI)
48YM c/c of back pain Pt state that he been having R upper back pain since his hernia surgery three weeks ago Pt state that he having increased pain at this time.

## (undated) DEVICE — COVER LT HNDL RIG FOR SUR CAM DISP

## (undated) DEVICE — BITEBLOCK ENDOSCP 60FR MAXI STRP

## (undated) DEVICE — ENDOPATH ULTRA VERESS INSUFFLATION NEEDLES WITH LUER LOCK CONNECTORS: Brand: ENDOPATH

## (undated) DEVICE — STERILE DRAPE FOR USE WITH SITUATE ROOM SCANNER: Brand: SITUATE

## (undated) DEVICE — 40580 - THE PINK PAD - ADVANCED TRENDELENBURG POSITIONING KIT: Brand: 40580 - THE PINK PAD - ADVANCED TRENDELENBURG POSITIONING KIT

## (undated) DEVICE — SUT MCRYL 4-0 18IN PS-2 ABSRB UD 19MM 3/8 CIR

## (undated) DEVICE — BLADELESS OBTURATOR: Brand: WECK VISTA

## (undated) DEVICE — 3M™ RED DOT™ MONITORING ELECTRODE WITH FOAM TAPE AND STICKY GEL, 50/BAG, 20/CASE, 72/PLT 2570: Brand: RED DOT™

## (undated) DEVICE — KIT VLV 5 PC AIR H2O SUCT BX ENDOGATOR CONN

## (undated) DEVICE — GLOVE SUR 7.5 SENSICARE PI PIP GRN PWD F

## (undated) DEVICE — BALLOON HEMOSTATIC EUS RADIAL

## (undated) DEVICE — GLOVE SUR 7 SENSICARE PI PIP CRM PWD F

## (undated) DEVICE — ROBOTIC GENERAL: Brand: MEDLINE INDUSTRIES, INC.

## (undated) DEVICE — 10FT COMBINED O2 DELIVERY/CO2 MONITORING. FILTER WITH MICROSTREAM TYPE LUER: Brand: DUAL ADULT NASAL CANNULA

## (undated) DEVICE — CANNULA SEAL

## (undated) DEVICE — LAPAROVUE VISIBILITY SYSTEM LAPAROSCOPIC SOLUTIONS: Brand: LAPAROVUE

## (undated) DEVICE — ARM DRAPE

## (undated) DEVICE — 2, DISPOSABLE SUCTION/IRRIGATOR WITHOUT DISPOSABLE TIP: Brand: STRYKEFLOW

## (undated) DEVICE — #15 STERILE STAINLESS BLADE: Brand: STERILE STAINLESS BLADES

## (undated) DEVICE — SUTURE PDS II 1 L36IN ABSRB VLT L36MM CT-1

## (undated) DEVICE — ADHESIVE SKIN TOP FOR WND CLSR DERMBND ADV

## (undated) DEVICE — 1200CC GUARDIAN II: Brand: GUARDIAN

## (undated) DEVICE — TRAY CATH 16FR F INCL BARDX IC COMPLT CARE

## (undated) DEVICE — GIJAW SINGLE-USE BIOPSY FORCEPS WITH NEEDLE: Brand: GIJAW

## (undated) DEVICE — ABSORBABLE WOUND CLOSURE DEVICE: Brand: V-LOC 90

## (undated) DEVICE — FENESTRATED BIPOLAR FORCEPS: Brand: ENDOWRIST

## (undated) DEVICE — MONOPOLAR CURVED SCISSORS: Brand: ENDOWRIST

## (undated) DEVICE — SUT COAT VCRL 3-0 27IN SH ABSRB UD 26MM 1/2

## (undated) DEVICE — Device

## (undated) DEVICE — MEGA NEEDLE DRIVER: Brand: ENDOWRIST

## (undated) DEVICE — TIP COVER ACCESSORY

## (undated) DEVICE — SUTURE ABSORB STRATAFIX 12IN

## (undated) DEVICE — COLUMN DRAPE

## (undated) DEVICE — VIOLET BRAIDED (POLYGLACTIN 910), SYNTHETIC ABSORBABLE SUTURE: Brand: COATED VICRYL

## (undated) DEVICE — Device: Brand: SUTURE PASSOR PRO

## (undated) DEVICE — STERIS KITS

## (undated) NOTE — LETTER
OUTSIDE TESTING RESULT REQUEST     IMPORTANT: FOR YOUR IMMEDIATE ATTENTION  Please FAX all test results listed below to: [phone]     Testing already done on or about: 2023     * * * * If testing is NOT complete, arrange with patient A.S.A.P. * * * *      Patient Name: Leydi Beach  Surgery Date: 2023  Medical Record: [id number]  CSN: [id number]  : 8/15/1975 - A: 46 y     Sex: male  Surgeon(s):  David Avila MD  Procedure: ESOPHAGOGASTRODUODENOSCOPY (EGD), ENDOSCOPIC ULTRASOUND (EUS)  Anesthesia Type: MAC     Surgeon: David Avila MD     The following Testing and Time Line are REQUIRED PER ANESTHESIA     Electrolytes within 21 days      Thank You,   Sent by:Ashley GARAY

## (undated) NOTE — LETTER
OUTSIDE TESTING RESULT REQUEST     IMPORTANT: FOR YOUR IMMEDIATE ATTENTION  Please FAX all test results listed below to: [phone]           Patient Name: Jesse Tarango  Surgery Date: 3/29/2024  Medical Record: [id number]  CSN: [id number]  : 8/15/1975 - A: 48 y     Sex: male  Surgeon(s):  Houston Shipman MD  Procedure: ROBOTIC LAPAROSCOPIC VENTRAL HERNIA REPAIR WITH MESH (eTEP) EXTENDED TOTALLY EXTRAPERITONEAL REPAIR.  Anesthesia Type: General     Surgeon: Houston Shipman MD     The following Testing and Time Line are REQUIRED PER ANESTHESIA     EKG- pt would like to do thru your office. He will contact you to schedule      Thank You,   Sent by: Fay Arroyo